# Patient Record
Sex: FEMALE | Race: OTHER | HISPANIC OR LATINO | Employment: OTHER | URBAN - METROPOLITAN AREA
[De-identification: names, ages, dates, MRNs, and addresses within clinical notes are randomized per-mention and may not be internally consistent; named-entity substitution may affect disease eponyms.]

---

## 2017-08-10 ENCOUNTER — CONVERSION ENCOUNTER (OUTPATIENT)
Dept: MAMMOGRAPHY | Facility: CLINIC | Age: 82
End: 2017-08-10

## 2018-01-23 ENCOUNTER — CONVERSION ENCOUNTER (OUTPATIENT)
Dept: MAMMOGRAPHY | Facility: CLINIC | Age: 83
End: 2018-01-23

## 2018-05-07 LAB
ALBUMIN SERPL BCP-MCNC: 4 G/DL (ref 3–5.2)
ALP SERPL-CCNC: 65 U/L (ref 43–122)
ALT SERPL W P-5'-P-CCNC: 23 U/L (ref 9–52)
AMORPHOUS MATERIAL (HISTORICAL): ABNORMAL
ANION GAP SERPL CALCULATED.3IONS-SCNC: 10 MMOL/L (ref 5–14)
AST SERPL W P-5'-P-CCNC: 20 U/L (ref 14–36)
BACTERIA UR QL AUTO: ABNORMAL
BILIRUB SERPL-MCNC: 0.2 MG/DL
BILIRUB UR QL STRIP: NEGATIVE MG/DL
BUN SERPL-MCNC: 19 MG/DL (ref 5–25)
CALCIUM SERPL-MCNC: 9.3 MG/DL (ref 8.4–10.2)
CASTS/CASTS TYPE (HISTORICAL): ABNORMAL /LPF
CHLORIDE SERPL-SCNC: 110 MEQ/L (ref 97–108)
CK SERPL-CCNC: 21 U/L (ref 30–135)
CLARITY UR: CLEAR
CO2 SERPL-SCNC: 23 MMOL/L (ref 22–30)
COLOR UR: ABNORMAL
CREATINE, SERUM (HISTORICAL): 0.85 MG/DL (ref 0.6–1.2)
CRYSTAL TYPE (HISTORICAL): ABNORMAL /HPF
EGFR (HISTORICAL): >60 ML/MIN/1.73 M2
GLUCOSE SERPL-MCNC: 155 MG/DL (ref 70–99)
GLUCOSE UR STRIP-MCNC: NEGATIVE MG/DL
HGB UR QL STRIP.AUTO: NEGATIVE
KETONES UR STRIP-MCNC: NEGATIVE MG/DL
LEUKOCYTE ESTERASE UR QL STRIP: ABNORMAL
LIPASE SERPL-CCNC: 70 U/L (ref 23–300)
MUCOUS THREADS URNS QL MICRO: ABNORMAL
NITRITE UR QL STRIP: NEGATIVE
NON-SQ EPI CELLS URNS QL MICRO: ABNORMAL
OTHER STN SPEC: ABNORMAL
PH UR STRIP.AUTO: 5 [PH] (ref 4.5–8)
POTASSIUM SERPL-SCNC: 4.5 MEQ/L (ref 3.6–5)
PROT UR STRIP-MCNC: 30 MG/DL
RBC #/AREA URNS AUTO: ABNORMAL /HPF
SODIUM SERPL-SCNC: 143 MEQ/L (ref 137–147)
SP GR UR STRIP.AUTO: 1.02 (ref 1–1.04)
TOTAL PROTEIN (HISTORICAL): 6.6 G/DL (ref 5.9–8.4)
UROBILINOGEN UR QL STRIP.AUTO: NEGATIVE MG/DL (ref 0–1)
WBC #/AREA URNS AUTO: >35 /HPF

## 2018-06-14 LAB
ABSOL LYMPHOCYTES (HISTORICAL): 1.6 K/UL (ref 0.5–4)
AMORPHOUS MATERIAL (HISTORICAL): ABNORMAL
ANION GAP SERPL CALCULATED.3IONS-SCNC: 9 MMOL/L (ref 5–14)
BACTERIA UR QL AUTO: ABNORMAL
BASOPHILS # BLD AUTO: 0.1 K/UL (ref 0–0.1)
BASOPHILS # BLD AUTO: 1 % (ref 0–1)
BILIRUB UR QL STRIP: NEGATIVE MG/DL
BUN SERPL-MCNC: 13 MG/DL (ref 5–25)
CALCIUM SERPL-MCNC: 9.4 MG/DL (ref 8.4–10.2)
CASTS/CASTS TYPE (HISTORICAL): ABNORMAL /LPF
CHLORIDE SERPL-SCNC: 104 MEQ/L (ref 97–108)
CLARITY UR: CLEAR
CO2 SERPL-SCNC: 27 MMOL/L (ref 22–30)
COLOR UR: ABNORMAL
COMMENT (HISTORICAL): ABNORMAL
CREATINE, SERUM (HISTORICAL): 0.82 MG/DL (ref 0.6–1.2)
CRYSTAL TYPE (HISTORICAL): ABNORMAL /HPF
DEPRECATED RDW RBC AUTO: 15 %
EGFR (HISTORICAL): >60 ML/MIN/1.73 M2
EOSINOPHIL # BLD AUTO: 0.2 K/UL (ref 0–0.4)
EOSINOPHIL NFR BLD AUTO: 3 % (ref 0–6)
GLUCOSE SERPL-MCNC: 103 MG/DL (ref 70–99)
GLUCOSE UR STRIP-MCNC: NEGATIVE MG/DL
HCT VFR BLD AUTO: 32.6 % (ref 36–46)
HGB BLD-MCNC: 10.2 G/DL (ref 12–16)
HGB UR QL STRIP.AUTO: NEGATIVE
KETONES UR STRIP-MCNC: NEGATIVE MG/DL
LEUKOCYTE ESTERASE UR QL STRIP: ABNORMAL
LYMPHOCYTES NFR BLD AUTO: 30 % (ref 25–45)
MCH RBC QN AUTO: 23.2 PG (ref 26–34)
MCHC RBC AUTO-ENTMCNC: 31.4 % (ref 31–36)
MCV RBC AUTO: 74 FL (ref 80–100)
MONOCYTES # BLD AUTO: 0.4 K/UL (ref 0.2–0.9)
MONOCYTES NFR BLD AUTO: 8 % (ref 1–10)
MUCOUS THREADS URNS QL MICRO: ABNORMAL
NEUTROPHILS ABS COUNT (HISTORICAL): 3 K/UL (ref 1.8–7.8)
NEUTS SEG NFR BLD AUTO: 58 % (ref 45–65)
NITRITE UR QL STRIP: NEGATIVE
NON-SQ EPI CELLS URNS QL MICRO: ABNORMAL
OTHER STN SPEC: ABNORMAL
PH UR STRIP.AUTO: 5 [PH] (ref 4.5–8)
PLATELET # BLD AUTO: 149 K/MCL (ref 150–450)
POTASSIUM SERPL-SCNC: 4.5 MEQ/L (ref 3.6–5)
PROT UR STRIP-MCNC: NEGATIVE MG/DL
RBC # BLD AUTO: 4.42 M/MCL (ref 4–5.2)
RBC #/AREA URNS AUTO: ABNORMAL /HPF
RBC MORPHOLOGY (HISTORICAL): ABNORMAL
SODIUM SERPL-SCNC: 140 MEQ/L (ref 137–147)
SP GR UR STRIP.AUTO: 1.01 (ref 1–1.04)
TSH SERPL DL<=0.05 MIU/L-ACNC: 0.96 UIU/ML (ref 0.47–4.68)
UROBILINOGEN UR QL STRIP.AUTO: NEGATIVE MG/DL (ref 0–1)
WBC # BLD AUTO: 5.3 K/MCL (ref 4.5–11)
WBC #/AREA URNS AUTO: 3 /HPF

## 2018-06-21 ENCOUNTER — TRANSCRIBE ORDERS (OUTPATIENT)
Dept: ADMINISTRATIVE | Facility: HOSPITAL | Age: 83
End: 2018-06-21

## 2018-06-21 DIAGNOSIS — L90.5 SCAR PAINFUL: Primary | ICD-10-CM

## 2018-06-21 DIAGNOSIS — R52 SCAR PAINFUL: Primary | ICD-10-CM

## 2018-06-21 LAB
AMORPHOUS MATERIAL (HISTORICAL): ABNORMAL
BACTERIA UR QL AUTO: ABNORMAL
BILIRUB UR QL STRIP: NEGATIVE MG/DL
CASTS/CASTS TYPE (HISTORICAL): ABNORMAL /LPF
CLARITY UR: CLEAR
COLOR UR: YELLOW
CRYSTAL TYPE (HISTORICAL): ABNORMAL /HPF
GLUCOSE UR STRIP-MCNC: NEGATIVE MG/DL
HGB UR QL STRIP.AUTO: NEGATIVE
KETONES UR STRIP-MCNC: NEGATIVE MG/DL
LEUKOCYTE ESTERASE UR QL STRIP: ABNORMAL
MUCOUS THREADS URNS QL MICRO: ABNORMAL
NITRITE UR QL STRIP: NEGATIVE
NON-SQ EPI CELLS URNS QL MICRO: ABNORMAL
OTHER STN SPEC: ABNORMAL
PH UR STRIP.AUTO: 5 [PH] (ref 4.5–8)
PROT UR STRIP-MCNC: NEGATIVE MG/DL
RBC #/AREA URNS AUTO: ABNORMAL /HPF
SP GR UR STRIP.AUTO: 1.02 (ref 1–1.04)
UROBILINOGEN UR QL STRIP.AUTO: NEGATIVE MG/DL (ref 0–1)
WBC #/AREA URNS AUTO: ABNORMAL /HPF

## 2018-06-27 ENCOUNTER — HOSPITAL ENCOUNTER (OUTPATIENT)
Dept: ULTRASOUND IMAGING | Facility: HOSPITAL | Age: 83
Discharge: HOME | End: 2018-06-27
Payer: COMMERCIAL

## 2018-06-27 DIAGNOSIS — L90.5 SCAR PAINFUL: ICD-10-CM

## 2018-06-27 DIAGNOSIS — R52 SCAR PAINFUL: ICD-10-CM

## 2018-06-27 PROCEDURE — 76770 US EXAM ABDO BACK WALL COMP: CPT

## 2018-06-27 PROCEDURE — 76700 US EXAM ABDOM COMPLETE: CPT

## 2018-07-06 ENCOUNTER — TELEPHONE (OUTPATIENT)
Dept: FAMILY MEDICINE CLINIC | Facility: CLINIC | Age: 83
End: 2018-07-06

## 2018-07-06 DIAGNOSIS — E05.90 HYPERTHYROIDISM: Primary | ICD-10-CM

## 2018-07-06 RX ORDER — METHIMAZOLE 5 MG/1
0.5 TABLET ORAL 2 TIMES DAILY
COMMUNITY
Start: 2014-04-19 | End: 2018-07-06 | Stop reason: SDUPTHER

## 2018-07-06 RX ORDER — METHIMAZOLE 5 MG/1
TABLET ORAL
Qty: 30 TABLET | Refills: 0 | Status: SHIPPED | OUTPATIENT
Start: 2018-07-06

## 2018-07-09 NOTE — TELEPHONE ENCOUNTER
Patient called and told of her medication refill  Patient medication refilled at this time but will need to call Dr Lisa Belcher for further refill  Patient understands

## 2018-07-16 DIAGNOSIS — I10 ESSENTIAL HYPERTENSION: Primary | ICD-10-CM

## 2018-07-17 RX ORDER — CARVEDILOL 12.5 MG/1
TABLET ORAL
Qty: 180 TABLET | Refills: 1 | Status: SHIPPED | OUTPATIENT
Start: 2018-07-17 | End: 2019-08-22 | Stop reason: SDUPTHER

## 2018-07-30 DIAGNOSIS — M54.5 CHRONIC MIDLINE LOW BACK PAIN, WITH SCIATICA PRESENCE UNSPECIFIED: Primary | ICD-10-CM

## 2018-07-30 DIAGNOSIS — G89.29 CHRONIC MIDLINE LOW BACK PAIN, WITH SCIATICA PRESENCE UNSPECIFIED: Primary | ICD-10-CM

## 2018-08-01 LAB
ALBUMIN SERPL-MCNC: 4 G/DL (ref 3.6–5.1)
ALBUMIN/CREAT UR: 11 MCG/MG CREAT
ALBUMIN/GLOB SERPL: 1.7 (CALC) (ref 1–2.5)
ALP SERPL-CCNC: 60 U/L (ref 33–130)
ALT SERPL-CCNC: 9 U/L (ref 6–29)
AST SERPL-CCNC: 17 U/L (ref 10–35)
BASOPHILS # BLD AUTO: 41 CELLS/UL (ref 0–200)
BASOPHILS NFR BLD AUTO: 0.7 %
BILIRUB SERPL-MCNC: 0.5 MG/DL (ref 0.2–1.2)
BUN SERPL-MCNC: 15 MG/DL (ref 7–25)
BUN/CREAT SERPL: 17 (CALC) (ref 6–22)
CALCIUM SERPL-MCNC: 9.3 MG/DL (ref 8.6–10.4)
CHLORIDE SERPL-SCNC: 107 MMOL/L (ref 98–110)
CHOLEST SERPL-MCNC: 169 MG/DL
CHOLEST/HDLC SERPL: 2.7 (CALC)
CO2 SERPL-SCNC: 26 MMOL/L (ref 20–31)
CREAT SERPL-MCNC: 0.89 MG/DL (ref 0.6–0.88)
CREAT UR-MCNC: 84 MG/DL (ref 20–320)
EOSINOPHIL # BLD AUTO: 128 CELLS/UL (ref 15–500)
EOSINOPHIL NFR BLD AUTO: 2.2 %
ERYTHROCYTE [DISTWIDTH] IN BLOOD BY AUTOMATED COUNT: 14.6 % (ref 11–15)
GLOBULIN SER CALC-MCNC: 2.4 G/DL (CALC) (ref 1.9–3.7)
GLUCOSE SERPL-MCNC: 130 MG/DL (ref 65–99)
HBA1C MFR BLD: 6.6 % OF TOTAL HGB
HCT VFR BLD AUTO: 31.9 % (ref 35–45)
HDLC SERPL-MCNC: 63 MG/DL
HGB BLD-MCNC: 10 G/DL (ref 11.7–15.5)
LDLC SERPL CALC-MCNC: 82 MG/DL (CALC)
LYMPHOCYTES # BLD AUTO: 2030 CELLS/UL (ref 850–3900)
LYMPHOCYTES NFR BLD AUTO: 35 %
MCH RBC QN AUTO: 23.5 PG (ref 27–33)
MCHC RBC AUTO-ENTMCNC: 31.3 G/DL (ref 32–36)
MCV RBC AUTO: 74.9 FL (ref 80–100)
MICROALBUMIN UR-MCNC: 0.9 MG/DL
MONOCYTES # BLD AUTO: 597 CELLS/UL (ref 200–950)
MONOCYTES NFR BLD AUTO: 10.3 %
NEUTROPHILS # BLD AUTO: 3004 CELLS/UL (ref 1500–7800)
NEUTROPHILS NFR BLD AUTO: 51.8 %
NONHDLC SERPL-MCNC: 106 MG/DL (CALC)
PLATELET # BLD AUTO: 167 THOUSAND/UL (ref 140–400)
PMV BLD REES-ECKER: 12.5 FL (ref 7.5–12.5)
POTASSIUM SERPL-SCNC: 4.3 MMOL/L (ref 3.5–5.3)
PROT SERPL-MCNC: 6.4 G/DL (ref 6.1–8.1)
RBC # BLD AUTO: 4.26 MILLION/UL (ref 3.8–5.1)
SL AMB EGFR AFRICAN AMERICAN: 70 ML/MIN/1.73M2
SL AMB EGFR NON AFRICAN AMERICAN: 60 ML/MIN/1.73M2
SODIUM SERPL-SCNC: 141 MMOL/L (ref 135–146)
TRIGL SERPL-MCNC: 139 MG/DL
TSH SERPL-ACNC: 1.43 MIU/L (ref 0.4–4.5)
WBC # BLD AUTO: 5.8 THOUSAND/UL (ref 3.8–10.8)

## 2018-08-30 ENCOUNTER — OFFICE VISIT (OUTPATIENT)
Dept: FAMILY MEDICINE CLINIC | Facility: CLINIC | Age: 83
End: 2018-08-30
Payer: COMMERCIAL

## 2018-08-30 VITALS
SYSTOLIC BLOOD PRESSURE: 110 MMHG | WEIGHT: 136.8 LBS | OXYGEN SATURATION: 98 % | RESPIRATION RATE: 20 BRPM | HEIGHT: 57 IN | BODY MASS INDEX: 29.51 KG/M2 | HEART RATE: 97 BPM | DIASTOLIC BLOOD PRESSURE: 60 MMHG | TEMPERATURE: 97 F

## 2018-08-30 DIAGNOSIS — R82.81 PYURIA: ICD-10-CM

## 2018-08-30 DIAGNOSIS — R79.89 LOW SERUM VITAMIN D: ICD-10-CM

## 2018-08-30 DIAGNOSIS — I65.23 CAROTID STENOSIS, ASYMPTOMATIC, BILATERAL: Primary | ICD-10-CM

## 2018-08-30 DIAGNOSIS — L30.9 DERMATITIS: ICD-10-CM

## 2018-08-30 DIAGNOSIS — Z12.31 SCREENING MAMMOGRAM, ENCOUNTER FOR: ICD-10-CM

## 2018-08-30 DIAGNOSIS — E78.00 PURE HYPERCHOLESTEROLEMIA: ICD-10-CM

## 2018-08-30 DIAGNOSIS — D56.1 BETA THALASSEMIA (HCC): ICD-10-CM

## 2018-08-30 DIAGNOSIS — R09.89 WEAK ARTERIAL PULSE: ICD-10-CM

## 2018-08-30 PROCEDURE — 3008F BODY MASS INDEX DOCD: CPT | Performed by: FAMILY MEDICINE

## 2018-08-30 PROCEDURE — 99214 OFFICE O/P EST MOD 30 MIN: CPT | Performed by: FAMILY MEDICINE

## 2018-08-30 RX ORDER — ERGOCALCIFEROL (VITAMIN D2) 1250 MCG
CAPSULE ORAL
COMMUNITY
Start: 2018-05-02 | End: 2019-03-27

## 2018-08-30 RX ORDER — SIMVASTATIN 40 MG
TABLET ORAL
COMMUNITY
Start: 2018-03-05

## 2018-08-30 RX ORDER — CLOTRIMAZOLE AND BETAMETHASONE DIPROPIONATE 10; .5 MG/ML; MG/ML
LOTION TOPICAL 2 TIMES DAILY
Qty: 30 ML | Refills: 0 | Status: SHIPPED | OUTPATIENT
Start: 2018-08-30 | End: 2018-11-28 | Stop reason: ALTCHOICE

## 2018-08-30 NOTE — PROGRESS NOTES
Assessment/Plan:      Diagnoses and all orders for this visit:    Carotid stenosis, asymptomatic, bilateral  -     VAS carotid complete study; Future    Dermatitis  Comments:  wear light cotton  clothing   call if not better or worse   Orders:  -     clotrimazole-betamethasone (LOTRISONE) 1-0 05 % lotion; Apply topically 2 (two) times a day    Low serum vitamin D  -     Vitamin D 25 hydroxy; Future    Beta thalassemia ([facility] [address] )    Screening mammogram, encounter for  -     Mammo screening bilateral w cad; Future    Weak arterial pulse  -     VAS lower limb arterial duplex, complete bilateral; Future    Pyuria  Comments:  resolved     Pure hypercholesterolemia  Comments:  to follow with low fat diet     Other orders  -     simvastatin (ZOCOR) 40 mg tablet; take 1 tablet (40MG)  by oral route  every day in the evening  -     ergocalciferol (ERGOCALCIFEROL) 83496 units capsule; take 1 capsule every  week          Subjective:     Patient ID: Neal Cheng is a 80 y o  female  Itching , x 2 week of right buttock off and one , mild , denied pain , leaking from this area ,   Pt wear pants all the time     Follow up on chronic medical problems  Pyuria, pt denied urinary frequency , dysuria or hematuria   Hyperthyroid ,denied wt loss , diarrhea or palpitation  Low vit d , denied falling    DM, denied polyuria or polydipsia ,follows with endo   Anemia , denied fatigue , rectal bleed , melena     Test results   Lab on 6-21 and  7-31-18 discussed        Review of Systems   Constitutional: Negative for activity change, appetite change, chills, fatigue, fever and unexpected weight change  HENT: Negative for congestion, ear pain, sinus pressure and sore throat  Eyes: Negative for visual disturbance  Respiratory: Negative for cough, chest tightness, shortness of breath and wheezing  Cardiovascular: Negative for chest pain, palpitations and leg swelling     Gastrointestinal: Negative for abdominal pain, blood in stool, constipation, diarrhea, nausea and vomiting  Genitourinary: Negative for dysuria, frequency, hematuria and urgency  Musculoskeletal: Negative for arthralgias, back pain, gait problem, joint swelling, myalgias and neck pain  Neurological: Negative for dizziness, tremors, seizures, syncope, weakness, light-headedness and headaches  Hematological: Negative for adenopathy  Does not bruise/bleed easily  Psychiatric/Behavioral: Negative for behavioral problems, confusion, dysphoric mood and sleep disturbance  Objective:     Physical Exam   Constitutional: She is oriented to person, place, and time  She appears well-developed and well-nourished  HENT:   Head: Normocephalic  Eyes: No scleral icterus  Neck: Neck supple  No JVD present  No thyromegaly present  Cardiovascular: Normal rate and regular rhythm  No murmur heard  Pulses:       Dorsalis pedis pulses are 1+ on the right side, and 1+ on the left side  Posterior tibial pulses are 1+ on the right side, and 1+ on the left side  Pulmonary/Chest: Effort normal and breath sounds normal    Abdominal: Soft  Bowel sounds are normal  She exhibits no distension and no mass  There is no tenderness  There is no rebound and no guarding  Musculoskeletal: She exhibits no edema or tenderness  Chronic arthritic changes of the 1st metatarsal -phalangeal  joint bilaterally   Feet:   Right Foot:   Skin Integrity: Negative for ulcer, skin breakdown, erythema, warmth, callus or dry skin  Left Foot:   Skin Integrity: Negative for ulcer, skin breakdown, erythema, warmth, callus or dry skin  Lymphadenopathy:     She has no cervical adenopathy  Neurological: She is alert and oriented to person, place, and time  No cranial nerve deficit  She exhibits normal muscle tone  Skin: No rash noted  No erythema  No pallor     The is dry , slightly pinkish brandon patch at right buttock and small one at left bottock   Psychiatric: She has a normal mood and affect  Her behavior is normal  Judgment and thought content normal      Right Foot/Ankle   Right Foot Inspection  Skin Exam: skin normal skin not intact, no dry skin, no warmth, no callus, no erythema, no maceration, no abnormal color, no pre-ulcer, no ulcer and no callus                          Toe Exam: no swelling and no tenderness  Sensory   Vibration: intact  Proprioception: intact   Monofilament testing: intact  Vascular    The right DP pulse is 1+  The right PT pulse is 1+  Left Foot/Ankle  Left Foot Inspection  Skin Exam: skin normalskin not intact, no dry skin, no warmth, no erythema, no maceration, normal color, no pre-ulcer, no ulcer and no callus                         Toe Exam: no swelling and no tenderness                   Sensory   Vibration: intact  Proprioception: intact  Monofilament: intact  Vascular    The left DP pulse is 1+  The left PT pulse is 1+

## 2018-08-31 PROBLEM — M48.00 SPINAL STENOSIS: Status: ACTIVE | Noted: 2018-08-31

## 2018-08-31 PROBLEM — E04.1 THYROID NODULE: Status: ACTIVE | Noted: 2018-08-31

## 2018-08-31 PROBLEM — E04.9 NON-TOXIC NODULAR GOITER: Status: ACTIVE | Noted: 2018-08-31

## 2018-08-31 PROBLEM — D56.3 THALASSEMIA MINOR: Status: ACTIVE | Noted: 2018-08-31

## 2018-08-31 PROBLEM — M19.90 OSTEOARTHRITIS: Status: ACTIVE | Noted: 2018-08-31

## 2018-08-31 PROBLEM — K57.90 DIVERTICULOSIS: Status: ACTIVE | Noted: 2018-08-31

## 2018-08-31 PROBLEM — I10 ESSENTIAL HYPERTENSION: Status: ACTIVE | Noted: 2017-03-07

## 2018-08-31 PROBLEM — I65.29 CAROTID ARTERY STENOSIS: Status: ACTIVE | Noted: 2018-08-31

## 2018-08-31 PROBLEM — D64.9 ANEMIA: Status: ACTIVE | Noted: 2017-03-07

## 2018-08-31 PROBLEM — K21.9 GASTROESOPHAGEAL REFLUX DISEASE: Status: ACTIVE | Noted: 2018-08-31

## 2018-08-31 PROBLEM — D25.9 UTERINE FIBROID: Status: ACTIVE | Noted: 2018-08-31

## 2018-08-31 PROBLEM — I51.7 CARDIOMEGALY: Status: ACTIVE | Noted: 2018-08-31

## 2018-09-10 ENCOUNTER — HOSPITAL ENCOUNTER (OUTPATIENT)
Dept: MAMMOGRAPHY | Facility: CLINIC | Age: 83
Discharge: HOME | End: 2018-09-10
Payer: COMMERCIAL

## 2018-09-10 DIAGNOSIS — Z12.31 SCREENING MAMMOGRAM, ENCOUNTER FOR: ICD-10-CM

## 2018-09-10 PROCEDURE — 77067 SCR MAMMO BI INCL CAD: CPT

## 2018-10-28 ENCOUNTER — OFFICE VISIT (OUTPATIENT)
Dept: URGENT CARE | Age: 83
End: 2018-10-28
Payer: COMMERCIAL

## 2018-10-28 VITALS
RESPIRATION RATE: 18 BRPM | HEART RATE: 77 BPM | SYSTOLIC BLOOD PRESSURE: 140 MMHG | TEMPERATURE: 98.1 F | WEIGHT: 137 LBS | BODY MASS INDEX: 29.56 KG/M2 | HEIGHT: 57 IN | OXYGEN SATURATION: 98 % | DIASTOLIC BLOOD PRESSURE: 60 MMHG

## 2018-10-28 DIAGNOSIS — J02.9 VIRAL PHARYNGITIS: Primary | ICD-10-CM

## 2018-10-28 PROCEDURE — 99203 OFFICE O/P NEW LOW 30 MIN: CPT | Performed by: PHYSICIAN ASSISTANT

## 2018-10-28 NOTE — PROGRESS NOTES
[address] [facility]        NAME: Bijan Ricketts is a 80 y o  female  : 1935    MRN: [id number]  DATE: 2018  TIME: 10:21 AM    Assessment and Plan   Viral pharyngitis [J02 9]  1  Viral pharyngitis  lidocaine viscous (XYLOCAINE) 2 % mucosal solution         Patient Instructions     Lidocaine as prescribed  Fluids and rest  Salt water gargles and chloraseptic spray  Throat Coat Tea  Wash hands frequently  Don't share drinks  Tylenol/Ibuprofen for pain/fever  Follow up with PCP in 3-5 days  Proceed to  ER if symptoms worsen  Chief Complaint     Chief Complaint   Patient presents with    Sore Throat     3 days; sore to swallow ; worsening         History of Present Illness       Sore Throat    This is a new problem  Episode onset: 3 days  The problem has been gradually worsening  Neither side of throat is experiencing more pain than the other  There has been no fever  The pain is moderate  Associated symptoms include coughing  Pertinent negatives include no abdominal pain, congestion, diarrhea, drooling, ear discharge, ear pain, headaches, hoarse voice, plugged ear sensation, neck pain, shortness of breath, stridor, swollen glands, trouble swallowing or vomiting  Associated symptoms comments: Hoarse voice    She has had no exposure to strep  Treatments tried: hot tea with honey  The treatment provided mild relief  Review of Systems   Review of Systems   Constitutional: Negative for activity change, appetite change, chills and fever  HENT: Positive for sore throat  Negative for congestion, dental problem, drooling, ear discharge, ear pain, facial swelling, hoarse voice, postnasal drip, rhinorrhea, sinus pain, sinus pressure, sneezing and trouble swallowing  Eyes: Negative for itching  Respiratory: Positive for cough  Negative for shortness of breath and stridor  Cardiovascular: Negative for chest pain and palpitations     Gastrointestinal: Negative for abdominal pain, constipation, diarrhea, nausea and vomiting  Musculoskeletal: Negative for myalgias and neck pain  Skin: Negative for rash  Neurological: Negative for dizziness, weakness, light-headedness and headaches           Current Medications       Current Outpatient Prescriptions:     aspirin 81 MG tablet, Take 81 mg by mouth daily  , Disp: , Rfl:     carvedilol (COREG) 12 5 mg tablet, take 1 tablet by mouth twice a day with food, Disp: 180 tablet, Rfl: 1    clotrimazole-betamethasone (LOTRISONE) 1-0 05 % lotion, Apply topically 2 (two) times a day, Disp: 30 mL, Rfl: 0    ergocalciferol (ERGOCALCIFEROL) 57144 units capsule, take 1 capsule every  week, Disp: , Rfl:     furosemide (LASIX) 20 mg tablet, take 1 tablet by mouth once daily PRN, Disp: , Rfl:     lidocaine viscous (XYLOCAINE) 2 % mucosal solution, Swish and spit 15 mL 4 (four) times a day as needed for mild pain, Disp: 100 mL, Rfl: 0    lisinopril (ZESTRIL) 20 mg tablet, Take 20 mg by mouth daily at bedtime, Disp: , Rfl: 0    LYRICA 75 MG capsule, take 1 capsule by mouth once daily, Disp: 90 capsule, Rfl: 0    methimazole (TAPAZOLE) 5 mg tablet, Take 1/2 tab BID by mouth daily, Disp: 30 tablet, Rfl: 0    Multiple Vitamins-Minerals (MULTIVITAMIN ADULT PO), Take 1 tablet po once daily , Disp: , Rfl:     simvastatin (ZOCOR) 40 mg tablet, take 1 tablet (40MG)  by oral route  every day in the evening, Disp: , Rfl:     SITagliptin-MetFORMIN HCl ER (JANUMET XR)  MG TB24, Take 1 tablet by mouth daily  , Disp: , Rfl:     Current Allergies     Allergies as of 10/28/2018    (No Known Allergies)            The following portions of the patient's history were reviewed and updated as appropriate: allergies, current medications, past family history, past medical history, past social history, past surgical history and problem list      Past Medical History:   Diagnosis Date    Anemia     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[facility] [address] )     Bilateral carotid artery stenosis     Cardiomegaly     Chicken pox     Chronic kidney disease, unspecified     Diabetes mellitus ([address] )     Hypertension     Menopausal state     Umbilical hernia     Vitamin D deficiency        Past Surgical History:   Procedure Laterality Date    APPENDECTOMY      CHOLECYSTECTOMY      UMBILICAL HERNIA REPAIR         Family History   Problem Relation Age of Onset    Diabetes Mother     Colon cancer Family          Medications have been verified  Objective   /60   Pulse 77   Temp 98 1 °F (36 7 °C)   Resp 18   Ht 4' 9" (1 448 m)   Wt 62 1 kg (137 lb)   SpO2 98%   BMI 29 65 kg/m²        Physical Exam     Physical Exam   Constitutional: She is oriented to person, place, and time  She appears well-developed and well-nourished  No distress  HENT:   Head: Normocephalic and atraumatic  Right Ear: External ear normal    Left Ear: External ear normal    Mouth/Throat: Oropharynx is clear and moist  No oropharyngeal exudate  Posterior pharynx erythematous without tonsillar hypertrophy or exudate  Cardiovascular: Normal rate, regular rhythm and normal heart sounds  Exam reveals no gallop and no friction rub  No murmur heard  Pulmonary/Chest: Effort normal  No respiratory distress  She has no wheezes  She has no rales  She exhibits no tenderness  Abdominal: Soft  There is no tenderness  Lymphadenopathy:     She has no cervical adenopathy  Neurological: She is alert and oriented to person, place, and time  Skin: Skin is warm and dry  No rash noted  Psychiatric: She has a normal mood and affect   Her behavior is normal  Judgment and thought content normal

## 2018-10-28 NOTE — PATIENT INSTRUCTIONS
Lidocaine as prescribed  Fluids and rest  Salt water gargles and chloraseptic spray  Throat Coat Tea  Wash hands frequently  Don't share drinks  Tylenol/Ibuprofen for pain/fever  Follow up with PCP in 3-5 days  Proceed to  ER if symptoms worsen  Pharyngitis   WHAT YOU NEED TO KNOW:   Pharyngitis, or sore throat, is inflammation of the tissues and structures in your pharynx (throat)  Pharyngitis is most often caused by bacteria  It may also be caused by a cold or flu virus  Other causes include smoking, allergies, or acid reflux  DISCHARGE INSTRUCTIONS:   Call 911 for any of the following:   · You have trouble breathing or swallowing because your throat is swollen or sore  Return to the emergency department if:   · You are drooling because it hurts too much to swallow  · Your fever is higher than 102? F (39?C) or lasts longer than 3 days  · You are confused  · You taste blood in your throat  Contact your healthcare provider if:   · Your throat pain gets worse  · You have a painful lump in your throat that does not go away after 5 days  · Your symptoms do not improve after 5 days  · You have questions or concerns about your condition or care  Medicines:  Viral pharyngitis will go away on its own without treatment  Your sore throat should start to feel better in 3 to 5 days for both viral and bacterial infections  You may need any of the following:  · Antibiotics  treat a bacterial infection  · NSAIDs , such as ibuprofen, help decrease swelling, pain, and fever  NSAIDs can cause stomach bleeding or kidney problems in certain people  If you take blood thinner medicine, always ask your healthcare provider if NSAIDs are safe for you  Always read the medicine label and follow directions  · Acetaminophen  decreases pain and fever  It is available without a doctor's order  Ask how much to take and how often to take it  Follow directions   Acetaminophen can cause liver damage if not taken correctly  · Take your medicine as directed  Contact your healthcare provider if you think your medicine is not helping or if you have side effects  Tell him or her if you are allergic to any medicine  Keep a list of the medicines, vitamins, and herbs you take  Include the amounts, and when and why you take them  Bring the list or the pill bottles to follow-up visits  Carry your medicine list with you in case of an emergency  Manage your symptoms:   · Gargle salt water  Mix ¼ teaspoon salt in an 8 ounce glass of warm water and gargle  This may help decrease swelling in your throat  · Drink liquids as directed  You may need to drink more liquids than usual  Liquids may help soothe your throat and prevent dehydration  Ask how much liquid to drink each day and which liquids are best for you  · Use a cool-steam humidifier  to help moisten the air in your room and calm your cough  · Soothe your throat  with cough drops, ice, soft foods, or popsicles  Prevent the spread of pharyngitis:  Cover your mouth and nose when you cough or sneeze  Do not share food or drinks  Wash your hands often  Use soap and water  If soap and water are unavailable, use an alcohol based hand   Follow up with your healthcare provider as directed:  Write down your questions so you remember to ask them during your visits  © 2017 [address] [employer] Information is for End User's use only and may not be sold, redistributed or otherwise used for commercial purposes  All illustrations and images included in CareNotes® are the copyrighted property of [employer]  or [employer]  The above information is an  only  It is not intended as medical advice for individual conditions or treatments  Talk to your doctor, nurse or pharmacist before following any medical regimen to see if it is safe and effective for you

## 2018-10-31 ENCOUNTER — OFFICE VISIT (OUTPATIENT)
Dept: FAMILY MEDICINE CLINIC | Facility: CLINIC | Age: 83
End: 2018-10-31
Payer: COMMERCIAL

## 2018-10-31 VITALS
WEIGHT: 135.6 LBS | BODY MASS INDEX: 29.34 KG/M2 | RESPIRATION RATE: 20 BRPM | OXYGEN SATURATION: 97 % | TEMPERATURE: 97 F | DIASTOLIC BLOOD PRESSURE: 60 MMHG | HEART RATE: 71 BPM | SYSTOLIC BLOOD PRESSURE: 120 MMHG

## 2018-10-31 DIAGNOSIS — R79.89 LOW VITAMIN D LEVEL: ICD-10-CM

## 2018-10-31 DIAGNOSIS — M79.18 CHRONIC BUTTOCK PAIN: Primary | ICD-10-CM

## 2018-10-31 DIAGNOSIS — Z23 NEED FOR IMMUNIZATION AGAINST INFLUENZA: ICD-10-CM

## 2018-10-31 DIAGNOSIS — R09.89 DECREASED PEDAL PULSES: ICD-10-CM

## 2018-10-31 DIAGNOSIS — I10 ESSENTIAL HYPERTENSION: ICD-10-CM

## 2018-10-31 DIAGNOSIS — I65.23 BILATERAL CAROTID ARTERY STENOSIS: ICD-10-CM

## 2018-10-31 DIAGNOSIS — J02.9 ACUTE PHARYNGITIS, UNSPECIFIED ETIOLOGY: ICD-10-CM

## 2018-10-31 DIAGNOSIS — G89.29 CHRONIC BUTTOCK PAIN: Primary | ICD-10-CM

## 2018-10-31 PROCEDURE — 3078F DIAST BP <80 MM HG: CPT | Performed by: FAMILY MEDICINE

## 2018-10-31 PROCEDURE — 3074F SYST BP LT 130 MM HG: CPT | Performed by: FAMILY MEDICINE

## 2018-10-31 PROCEDURE — 90662 IIV NO PRSV INCREASED AG IM: CPT

## 2018-10-31 PROCEDURE — 99214 OFFICE O/P EST MOD 30 MIN: CPT | Performed by: FAMILY MEDICINE

## 2018-10-31 PROCEDURE — G0008 ADMIN INFLUENZA VIRUS VAC: HCPCS

## 2018-10-31 NOTE — PROGRESS NOTES
Assessment/Plan:     Problem List Items Addressed This Visit     Carotid artery stenosis    Essential hypertension      Other Visit Diagnoses     Chronic buttock pain    -  Primary     bilateral    Acute pharyngitis, unspecified etiology        Improving  Patient to call if any further problem    Decreased pedal pulses         check arterial Doppler of the lower extremity    Low vitamin D level         check vitamin-D    Need for immunization against influenza        Relevant Orders    influenza vaccine, [id number], high-dose, PF 0 5 mL, for patients 65 yr+ (FLUZONE HIGH-DOSE) (Completed)           Diagnoses and all orders for this visit:    Chronic buttock pain  Comments:   bilateral    Acute pharyngitis, unspecified etiology  Comments:  Improving  Patient to call if any further problem    Essential hypertension  Comments:   controlled  Follow up with the dash diet    Decreased pedal pulses  Comments:   check arterial Doppler of the lower extremity    Bilateral carotid artery stenosis  Comments:   check carotid ultrasound    Low vitamin D level  Comments:   check vitamin-D    Need for immunization against influenza  -     influenza vaccine, [id number], high-dose, PF 0 5 mL, for patients 65 yr+ (FLUZONE HIGH-DOSE)            Subjective:     Patient ID: Maynor Crowe is a 80 y o  female        New complaint  Heavenly Prows Upper respiratory infection  Patient developed a slight sore throat and dry cough since last Friday  Was seen at the urgent care  Patient stated her symptoms much better  Denied the fever or chills  Buttock pain  Patient stated when she sit on heart the service chair she feels pain at both buttock  Denied any pain if she sits on the soft service  Denied any mass  Denied any skin problem  Symptoms has started 1 year ago  Mild  Sharp  Follow-up     Thyroid nodule  Denied neck mass she follow with Endocrinology    Diabetes denied polyuria and polydipsia her fasting blood sugar today was 130 she does follow with Endocrinology  Carotid stenosis  Denied loss of vision, slurred speech, weakness or numbness  Hypertension denied headache or       urgent care office visit 10/28/2018 noted   no test done since last office visit  Patient was supposed to have carotid ultrasound arterial Doppler of the lower extremities and vitamin-D level        Review of Systems   Constitutional: Negative for activity change, appetite change, chills, fatigue, fever and unexpected weight change  HENT: Positive for sore throat  Negative for congestion, ear pain and sinus pressure  Eyes: Negative for visual disturbance  Respiratory: Positive for cough  Negative for chest tightness, shortness of breath and wheezing  Cardiovascular: Negative for chest pain, palpitations and leg swelling  Gastrointestinal: Negative for abdominal pain, blood in stool, constipation, diarrhea, nausea and vomiting  Genitourinary: Negative for dysuria, frequency, hematuria and urgency  Musculoskeletal: Negative for arthralgias, back pain, gait problem, joint swelling, myalgias and neck pain  Skin: Negative for rash  Neurological: Negative for dizziness, tremors, seizures, syncope, weakness, light-headedness and headaches  Hematological: Negative for adenopathy  Does not bruise/bleed easily  Psychiatric/Behavioral: Negative for behavioral problems, confusion, dysphoric mood and sleep disturbance  Objective:     Physical Exam   Constitutional: She is oriented to person, place, and time  She appears well-developed and well-nourished  No distress  HENT:   Head: Normocephalic  Right Ear: External ear normal    Left Ear: External ear normal    Nose: Nose normal    Mouth/Throat: Oropharynx is clear and moist  No oropharyngeal exudate  Tympanic membranes are   Eyes: Pupils are equal, round, and reactive to light  Conjunctivae and EOM are normal  No scleral icterus  Neck: No JVD present  No thyromegaly present  Cardiovascular: Normal rate and regular rhythm  Pulses are weak pulses  No murmur heard  Pulses:       Dorsalis pedis pulses are 1+ on the right side, and 1+ on the left side  Posterior tibial pulses are 1+ on the right side, and 1+ on the left side  Extremities  No edema   Pulmonary/Chest: Effort normal    Abdominal: Soft  She exhibits no mass  There is no tenderness  There is no rebound and no guarding  No hernia  Musculoskeletal:   Lumbar spine  No tenderness  Pelvic bone there is no tenderness at the buttock area, no mass  Skin is intact  No rash    There is mild to moderate bunion of the 1st metatarsal cup per joints of the right foot   Feet:   Right Foot:   Skin Integrity: Positive for dry skin  Negative for ulcer, skin breakdown, erythema, warmth or callus  Left Foot:   Skin Integrity: Positive for dry skin  Negative for ulcer, skin breakdown, erythema, warmth or callus  Lymphadenopathy:     She has no cervical adenopathy  Neurological: She is alert and oriented to person, place, and time  She displays normal reflexes  No cranial nerve deficit or sensory deficit  She exhibits normal muscle tone  Coordination normal    Gait  Normal   Babinski bilaterally normal   Skin: No rash noted  Psychiatric: She has a normal mood and affect  Her behavior is normal  Judgment normal    Right Foot/Ankle   Right Foot Inspection  Skin Exam: dry skin skin not intact, no warmth, no callus, no erythema, no maceration, no abnormal color, no ulcer and no callus                          Toe Exam: ROM and strength within normal limits, erythema and right toe deformityno swelling and no tenderness  Sensory   Vibration: intact  Proprioception: intact   Monofilament testing: intact  Vascular  Capillary refills: < 3 seconds  The right DP pulse is 1+  The right PT pulse is 1+       Left Foot/Ankle  Left Foot Inspection  Skin Exam: dry skinskin not intact, no warmth, no erythema, no maceration, normal color, no pre-ulcer, no ulcer and no callus                         Toe Exam: no swelling, no tenderness, no erythema and no left toe deformity                   Sensory   Vibration: intact  Proprioception: intact  Monofilament: intact  Vascular  Capillary refills: < 3 seconds  The left DP pulse is 1+  The left PT pulse is 1+  Assign Risk Category:  Deformity present;  No loss of protective sensation; Weak pulses       Risk: 1

## 2018-11-09 DIAGNOSIS — M54.5 CHRONIC MIDLINE LOW BACK PAIN, WITH SCIATICA PRESENCE UNSPECIFIED: ICD-10-CM

## 2018-11-09 DIAGNOSIS — G89.29 CHRONIC MIDLINE LOW BACK PAIN, WITH SCIATICA PRESENCE UNSPECIFIED: ICD-10-CM

## 2018-11-11 RX ORDER — PREGABALIN 75 MG/1
75 CAPSULE ORAL DAILY
Qty: 90 CAPSULE | Refills: 0 | Status: SHIPPED | OUTPATIENT
Start: 2018-11-11 | End: 2019-02-12

## 2018-11-15 ENCOUNTER — HOSPITAL ENCOUNTER (OUTPATIENT)
Dept: NON INVASIVE DIAGNOSTICS | Facility: HOSPITAL | Age: 83
Discharge: HOME | End: 2018-11-15
Payer: COMMERCIAL

## 2018-11-15 DIAGNOSIS — I65.23 CAROTID STENOSIS, ASYMPTOMATIC, BILATERAL: ICD-10-CM

## 2018-11-15 DIAGNOSIS — R09.89 WEAK ARTERIAL PULSE: ICD-10-CM

## 2018-11-15 PROCEDURE — 93925 LOWER EXTREMITY STUDY: CPT | Performed by: SURGERY

## 2018-11-15 PROCEDURE — 93923 UPR/LXTR ART STDY 3+ LVLS: CPT

## 2018-11-15 PROCEDURE — 93922 UPR/L XTREMITY ART 2 LEVELS: CPT | Performed by: SURGERY

## 2018-11-15 PROCEDURE — 93925 LOWER EXTREMITY STUDY: CPT

## 2018-11-15 PROCEDURE — 93880 EXTRACRANIAL BILAT STUDY: CPT | Performed by: SURGERY

## 2018-11-15 PROCEDURE — 93880 EXTRACRANIAL BILAT STUDY: CPT

## 2018-11-28 ENCOUNTER — OFFICE VISIT (OUTPATIENT)
Dept: FAMILY MEDICINE CLINIC | Facility: CLINIC | Age: 83
End: 2018-11-28
Payer: COMMERCIAL

## 2018-11-28 VITALS
TEMPERATURE: 97 F | OXYGEN SATURATION: 100 % | WEIGHT: 135.8 LBS | RESPIRATION RATE: 20 BRPM | HEART RATE: 77 BPM | SYSTOLIC BLOOD PRESSURE: 104 MMHG | DIASTOLIC BLOOD PRESSURE: 60 MMHG | BODY MASS INDEX: 29.39 KG/M2

## 2018-11-28 DIAGNOSIS — F41.9 ANXIETY: ICD-10-CM

## 2018-11-28 DIAGNOSIS — R79.89 LOW VITAMIN D LEVEL: ICD-10-CM

## 2018-11-28 DIAGNOSIS — E04.1 THYROID NODULE: ICD-10-CM

## 2018-11-28 DIAGNOSIS — D25.9 UTERINE LEIOMYOMA, UNSPECIFIED LOCATION: Primary | ICD-10-CM

## 2018-11-28 DIAGNOSIS — I65.23 BILATERAL CAROTID ARTERY STENOSIS: ICD-10-CM

## 2018-11-28 PROCEDURE — 4040F PNEUMOC VAC/ADMIN/RCVD: CPT | Performed by: FAMILY MEDICINE

## 2018-11-28 PROCEDURE — 1160F RVW MEDS BY RX/DR IN RCRD: CPT | Performed by: FAMILY MEDICINE

## 2018-11-28 PROCEDURE — 99214 OFFICE O/P EST MOD 30 MIN: CPT | Performed by: FAMILY MEDICINE

## 2018-11-28 PROCEDURE — 1036F TOBACCO NON-USER: CPT | Performed by: FAMILY MEDICINE

## 2018-11-28 RX ORDER — ESCITALOPRAM OXALATE 5 MG/1
5 TABLET ORAL DAILY
Qty: 30 TABLET | Refills: 1 | Status: SHIPPED | OUTPATIENT
Start: 2018-11-28 | End: 2018-11-29 | Stop reason: SDUPTHER

## 2018-11-28 NOTE — PROGRESS NOTES
Assessment/Plan:          Diagnoses and all orders for this visit:    Uterine leiomyoma, unspecified location  -     US pelvis complete non OB; Future    Anxiety  Comments:  Patient take Lexapro as directed  Avoid drinking alcohol with medication  Call if any side effect  Orders:  -     Discontinue: escitalopram (LEXAPRO) 5 mg tablet; Take 1 tablet (5 mg total) by mouth daily  -     escitalopram (LEXAPRO) 5 mg tablet; Take 1 tablet (5 mg total) by mouth daily    Thyroid nodule  -     US thyroid; Future    Bilateral carotid artery stenosis  Comments:  Check carotid ultrasound in 1 year    Low vitamin D level  Comments:  Check vitamin-D level  Was ordered with previous office visit            Subjective:     Patient ID: Prince Cisse is a 80 y o  female        Patient is here for follow-up  Carotid stenosis  Patient denied loss of vision, slurred speech, weakness or numbness  Low vitamin-D falling or fatigue  Anxiety  Patient was a chronic mild anxiety  Constant  Secondary to having tow Sick daughters  Denied depression, suicide or homicide  Uterine fibroid  Patient denied pelvic pain, or vaginal bleeding  Diabetes  Today fasting blood sugar weight is 120  Patient follows with Endocrinology    Test results  Carotid ultrasound  Arterial Doppler of the lower extremities  Discussed result with patient  Vitamin-D level not done        Review of Systems   Constitutional: Negative for chills, diaphoresis and fatigue  HENT: Negative for ear pain, sore throat, trouble swallowing and voice change  Eyes: Negative for visual disturbance  Respiratory: Negative for cough, chest tightness and shortness of breath  Cardiovascular: Negative for chest pain, palpitations and leg swelling  Gastrointestinal: Negative for abdominal pain, blood in stool, constipation, diarrhea and nausea  Endocrine: Negative for polydipsia and polyuria     Genitourinary: Negative for dysuria, flank pain, frequency, hematuria, pelvic pain, urgency, vaginal bleeding and vaginal discharge  Musculoskeletal: Negative for arthralgias, back pain, gait problem, myalgias and neck pain  Neurological: Negative for dizziness, tremors, seizures, weakness, light-headedness, numbness and headaches  Hematological: Negative for adenopathy  Does not bruise/bleed easily  Psychiatric/Behavioral: Negative for confusion  Objective:     Physical Exam   Constitutional: She is oriented to person, place, and time  She appears well-developed and well-nourished  No distress  HENT:   Head: Normocephalic  Eyes: Pupils are equal, round, and reactive to light  Conjunctivae and EOM are normal  No scleral icterus  Neck: No JVD present  No thyromegaly present  Cardiovascular: Normal rate, regular rhythm and normal heart sounds  No murmur heard  Extremities  No edema   Pulmonary/Chest: Effort normal and breath sounds normal    Abdominal: Soft  She exhibits no mass  There is no tenderness  There is no rebound and no guarding  No hernia  Lymphadenopathy:     She has no cervical adenopathy  Neurological: She is alert and oriented to person, place, and time  No cranial nerve deficit  She exhibits normal muscle tone  Coordination normal    Skin: No rash noted  Psychiatric: She has a normal mood and affect   Her behavior is normal  Judgment and thought content normal

## 2018-11-29 RX ORDER — ESCITALOPRAM OXALATE 5 MG/1
5 TABLET ORAL DAILY
Qty: 30 TABLET | Refills: 0 | Status: SHIPPED | OUTPATIENT
Start: 2018-11-29 | End: 2018-12-24 | Stop reason: SDUPTHER

## 2018-12-04 ENCOUNTER — TELEPHONE (OUTPATIENT)
Dept: FAMILY MEDICINE CLINIC | Facility: CLINIC | Age: 83
End: 2018-12-04

## 2018-12-04 ENCOUNTER — HOSPITAL ENCOUNTER (OUTPATIENT)
Dept: ULTRASOUND IMAGING | Facility: HOSPITAL | Age: 83
Discharge: HOME | End: 2018-12-04
Payer: COMMERCIAL

## 2018-12-04 DIAGNOSIS — E04.1 THYROID NODULE: ICD-10-CM

## 2018-12-04 DIAGNOSIS — D25.9 UTERINE LEIOMYOMA, UNSPECIFIED LOCATION: ICD-10-CM

## 2018-12-04 DIAGNOSIS — R93.5 ABNORMAL ULTRASOUND OF UTERUS: Primary | ICD-10-CM

## 2018-12-04 PROCEDURE — 76856 US EXAM PELVIC COMPLETE: CPT

## 2018-12-04 PROCEDURE — 76830 TRANSVAGINAL US NON-OB: CPT

## 2018-12-04 PROCEDURE — 76536 US EXAM OF HEAD AND NECK: CPT

## 2018-12-24 DIAGNOSIS — F41.9 ANXIETY: ICD-10-CM

## 2019-01-02 ENCOUNTER — OFFICE VISIT (OUTPATIENT)
Dept: CARDIOLOGY CLINIC | Facility: CLINIC | Age: 84
End: 2019-01-02
Payer: COMMERCIAL

## 2019-01-02 VITALS
WEIGHT: 133.4 LBS | OXYGEN SATURATION: 100 % | HEIGHT: 57 IN | BODY MASS INDEX: 28.78 KG/M2 | HEART RATE: 68 BPM | DIASTOLIC BLOOD PRESSURE: 60 MMHG | SYSTOLIC BLOOD PRESSURE: 110 MMHG

## 2019-01-02 DIAGNOSIS — I10 HYPERTENSION, UNSPECIFIED TYPE: Primary | ICD-10-CM

## 2019-01-02 DIAGNOSIS — I10 ESSENTIAL HYPERTENSION: ICD-10-CM

## 2019-01-02 PROBLEM — E78.2 MIXED HYPERLIPIDEMIA: Status: ACTIVE | Noted: 2019-01-02

## 2019-01-02 PROCEDURE — 99214 OFFICE O/P EST MOD 30 MIN: CPT | Performed by: INTERNAL MEDICINE

## 2019-01-02 PROCEDURE — 93000 ELECTROCARDIOGRAM COMPLETE: CPT | Performed by: INTERNAL MEDICINE

## 2019-01-02 RX ORDER — LISINOPRIL 10 MG/1
10 TABLET ORAL
Qty: 90 TABLET | Refills: 3 | Status: SHIPPED | OUTPATIENT
Start: 2019-01-02 | End: 2020-03-15 | Stop reason: SDUPTHER

## 2019-01-02 RX ORDER — SITAGLIPTIN AND METFORMIN HYDROCHLORIDE 100; 1000 MG/1; MG/1
1 TABLET, FILM COATED, EXTENDED RELEASE ORAL DAILY
Refills: 0 | COMMUNITY
Start: 2018-12-28 | End: 2019-01-17 | Stop reason: ALTCHOICE

## 2019-01-02 NOTE — PROGRESS NOTES
Shikha [address]    [address], [address]   [address]  [address]  Phone#  [phone]  Fax#  [phone]  *-*-*-*-*-*-*-*-*-*-*-*-*-*-*-*-*-*-*-*-*-*-*-*-*-*-*-*-*-*-*-*-*-*-*-*-*-*-*-*-*-*-*-*-*-*-*-*-*-*-*-*-*-*    ENCOUNTER DATE: 01/02/19 9:14 AM    PATIENT NAME: Giovanna Davidson   1935    [id number]  Age: 80 y o  Sex: female    AUTHOR: Ana Howard MD  PRIMARYCARE PHYSICIAN: Aramis Stuart MD    DIAGNOSES:  1  Benign essential hypertension  2  Mixed dyslipidemia  3  History of chest pain  4  History of diabetes mellitus  5  Hypo and hyperthyroidism  6  Anemia  7  Degenerative joint disease  8  History of diverticulosis  9  Cervical radiculopathy  10 History of carotid artery disease  11  History of cholecystectomy and appendectomy  12  Vitamin-D deficiency  13  History of umbilical hernia    Dobutamine stress echocardiogram at [facility] in March 2017 was negative for ischemia  EF was reported as 60%  No significant valvular abnormality  CURRENT ECG:  Results for orders placed or performed in visit on 01/02/19   POCT ECG    Narrative    Sinus rhythm, HR 68 bpm, leftward axis, RSR prime in lead V1, nonspecific ST T-wave abnormalities  CARDIOLOGY ASSESSMENT & PLAN:  Essential hypertension  Blood pressure is reasonably well controlled  Last echocardiogram in 2017 showed normal left ventricular function  - we are lowering the dose of lisinopril to 10 mg daily as long as this would maintain her blood pressure consistently less than 130/80   - we are continuing carvedilol and other medications  - Dietary and medical compliance are reinforced  - Advised  to report any concerning symptoms such as chest pain, shortness of breath, decline in exercise tolerance or presyncope/syncope  Mixed hyperlipidemia  Tolerating low-dose simvastatin therapy  No recent lipid profile available      Will need periodic monitoring of LFTs and lipid profile  INTERVAL HISTORY & HISTORY OF PRESENT ILLNESS:  Omer Vargas is here for follow-up regarding her cardiac comorbidities which include: history of chest pain  She is here for follow-up  She has been overall doing well from cardiac perspective except for occasional dizziness which she feels when she more suddenly  Has had no recent typical chest pain, palpitations or other symptoms  Exercise tolerance is good and unchanged  No recent hospitalizations or other illnesses  She is physically active and visits her disabled daughter in nursing home 2 to 3 times a week  She is wondering if antihypertensive can be lowered as she feels she is taking too much  REVIEW OF SYMPTOMS:    Positive for:  No significant symptoms except for occasional dizziness  Negative for: All remaining as reviewed below and in HPI  SYSTEM SYMPTOMS REVIEWED:  General--weight change, fever, night sweats  Respirato  Cardiovascular--chest pain, syncope, dyspnea on exertion, edema, decline in exercise tolerance, claudication   Gastrointestinal--persistent vomiting, diarrhea, abdominal distention, blood in stool   Muscular or skeletal--joint pain or swelling   Neurologic--headaches, syncope, abnormal movement  Hematologic--history of easy bruising and bleeding   Endocrine--thyroid enlargement, heat or cold intolerance, polyuria   Psychiatric--anxiety, depression     *-*-*-*-*-*-*-*-*-*-*-*-*-*-*-*-*-*-*-*-*-*-*-*-*-*-*-*-*-*-*-*-*-*-*-*-*-*-*-*-*-*-*-*-*-*-*-*-*-*-*-*-*-*-  VITAL SIGNS:  Vitals:    01/02/19 0839   BP: 110/60   Pulse: 68   SpO2: 100%   Weight: 60 5 kg (133 lb 6 4 oz)   Height: 4' 9" (1 448 m)       *-*-*-*-*-*-*-*-*-*-*-*-*-*-*-*-*-*-*-*-*-*-*-*-*-*-*-*-*-*-*-*-*-*-*-*-*-*-*-*-*-*-*-*-*-*-*-*-*-*-*-*-*-*-  PHYSICAL EXAM:  General Appearance:    Alert, cooperative, no distress, appears stated age short stature, normal built   Head, Eyes, ENT:    No obvious abnormality, moist mucous mebranes     Neck: Supple, no carotid bruit or JVD   Back:     Symmetric, no curvature  Lungs:     Respirations unlabored   Clear to auscultation bilaterally,    Chest wall:    No tenderness or deformity   Heart:    Regular rate and rhythm, S1 and S2 normal, no murmur, rub  or gallop ,    Abdomen:     Soft, non-tender, No obvious masses, or organomegaly   Extremities:   Extremities normal, no cyanosis or edema    Skin:   Skin color, texture, turgor normal, no rashes or lesions     *-*-*-*-*-*-*-*-*-*-*-*-*-*-*-*-*-*-*-*-*-*-*-*-*-*-*-*-*-*-*-*-*-*-*-*-*-*-*-*-*-*-*-*-*-*-*-*-*-*-*-*-*-*-  CURRENT MEDICATION LIST:    Current Outpatient Prescriptions:     aspirin 81 MG tablet, Take 81 mg by mouth daily  , Disp: , Rfl:     carvedilol (COREG) 12 5 mg tablet, take 1 tablet by mouth twice a day with food, Disp: 180 tablet, Rfl: 1    ergocalciferol (ERGOCALCIFEROL) 26825 units capsule, take 1 capsule every  week, Disp: , Rfl:     escitalopram (LEXAPRO) 5 mg tablet, Take 1 tablet (5 mg total) by mouth daily, Disp: 30 tablet, Rfl: 0    furosemide (LASIX) 20 mg tablet, take 1 tablet by mouth once daily PRN, Disp: , Rfl:     JANUMET -1000 MG TB24, Take 1 tablet by mouth daily, Disp: , Rfl: 0    lisinopril (ZESTRIL) 10 mg tablet, Take 1 tablet (10 mg total) by mouth daily at bedtime, Disp: 90 tablet, Rfl: 3    methimazole (TAPAZOLE) 5 mg tablet, Take 1/2 tab BID by mouth daily, Disp: 30 tablet, Rfl: 0    Multiple Vitamins-Minerals (MULTIVITAMIN ADULT PO), Take 1 tablet po once daily , Disp: , Rfl:     PHARMACIST CHOICE ALCOHOL 70 % PADS, USE 3 TIMES A DAY AFTER CHECKING BLOOD GLUCOSE, Disp: , Rfl: 3    pregabalin (LYRICA) 75 mg capsule, Take 1 capsule (75 mg total) by mouth daily, Disp: 90 capsule, Rfl: 0    simvastatin (ZOCOR) 40 mg tablet, take 1 tablet (40MG)  by oral route  every day in the evening, Disp: , Rfl:     SITagliptin-MetFORMIN HCl ER (JANUMET XR)  MG TB24, Take 1 tablet by mouth daily  , Disp: , Rfl: ALLERGIES:  No Known Allergies    *-*-*-*-*-*-*-*-*-*-*-*-*-*-*-*-*-*-*-*-*-*-*-*-*-*-*-*-*-*-*-*-*-*-*-*-*-*-*-*-*-*-*-*-*-*-*-*-*-*-*-*-*-*-    LABORATORY DATA:  I have personally reviewed pertinent labs      Sodium   Date Value Ref Range Status   06/14/2018 140 137 - 147 MEQ/L Final   05/07/2018 143 137 - 147 MEQ/L Final     Potassium   Date Value Ref Range Status   07/31/2018 4 3 3 5 - 5 3 mmol/L Final   06/14/2018 4 5 3 6 - 5 0 MEQ/L Final   05/07/2018 4 5 3 6 - 5 0 MEQ/L Final     Chloride   Date Value Ref Range Status   07/31/2018 107 98 - 110 mmol/L Final   06/14/2018 104 97 - 108 MEQ/L Final   05/07/2018 110 (H) 97 - 108 MEQ/L Final     CO2   Date Value Ref Range Status   07/31/2018 26 20 - 31 mmol/L Final   06/14/2018 27 22 - 30 MMOL/L Final   05/07/2018 23 22 - 30 MMOL/L Final     Anion Gap   Date Value Ref Range Status   06/14/2018 9 5 - 14 MMOL/L Final   05/07/2018 10 5 - 14 MMOL/L Final     BUN   Date Value Ref Range Status   07/31/2018 15 7 - 25 mg/dL Final   06/14/2018 13 5 - 25 MG/DL Final   05/07/2018 19 5 - 25 MG/DL Final     Glucose   Date Value Ref Range Status   06/14/2018 103 (H) 70 - 99 MG/DL Final   05/07/2018 155 (H) 70 - 99 MG/DL Final     SL AMB CALCIUM   Date Value Ref Range Status   07/31/2018 9 3 8 6 - 10 4 mg/dL Final     Calcium   Date Value Ref Range Status   06/14/2018 9 4 8 4 - 10 2 MG/DL Final   05/07/2018 9 3 8 4 - 10 2 MG/DL Final     AST   Date Value Ref Range Status   05/07/2018 20 14 - 36 U/L Final     ALT   Date Value Ref Range Status   05/07/2018 23 9 - 52 U/L Final     Alkaline Phosphatase   Date Value Ref Range Status   07/31/2018 60 33 - 130 U/L Final   05/07/2018 65 43 - 122 U/L Final     Total Protein   Date Value Ref Range Status   05/07/2018 6 6 5 9 - 8 4 G/DL Final     Total Bilirubin   Date Value Ref Range Status   05/07/2018 0 2 <1 3 mg/dL Final     WBC   Date Value Ref Range Status   06/14/2018 5 3 4 5 - 11 0 K/MCL Final     White Blood Cell Count   Date Value Ref Range Status   07/31/2018 5 8 3 8 - 10 8 Thousand/uL Final     Hemoglobin   Date Value Ref Range Status   07/31/2018 10 0 (L) 11 7 - 15 5 g/dL Final   06/14/2018 10 2 (L) 12 0 - 16 0 G/DL Final     Platelet Count   Date Value Ref Range Status   07/31/2018 167 140 - 400 Thousand/uL Final     Platelets   Date Value Ref Range Status   06/14/2018 149 (L) 150 - 450 K/MCL Final     No results found for: PT, PTT, INR  No results found for: CKMB, BNP, DIGOXIN  TSH   Date Value Ref Range Status   07/31/2018 1 43 0 40 - 4 50 mIU/L Final     HDL   Date Value Ref Range Status   07/31/2018 63 >50 mg/dL Final     Triglycerides   Date Value Ref Range Status   07/31/2018 139 <150 mg/dL Final      Hemoglobin A1C   Date Value Ref Range Status   07/31/2018 6 6 (H) <5 7 % of total Hgb Final     Comment:     For someone without known diabetes, a hemoglobin A1c  value of 6 5% or greater indicates that they may have   diabetes and this should be confirmed with a follow-up   test      For someone with known diabetes, a value <7% indicates   that their diabetes is well controlled and a value   greater than or equal to 7% indicates suboptimal   control  A1c targets should be individualized based on   duration of diabetes, age, comorbid conditions, and   other considerations  Currently, no consensus exists regarding use of  hemoglobin A1c for diagnosis of diabetes for children  No results found for: Pino Feathers, GRAMSTAIN, URINECX, WOUNDCULT, BODYFLUIDCUL, MRSACULTURE, INFLUAPCR, INFLUBPCR, RSVPCR, LEGIONELLAUR, CDIFFTOXINB    *-*-*-*-*-*-*-*-*-*-*-*-*-*-*-*-*-*-*-*-*-*-*-*-*-*-*-*-*-*-*-*-*-*-*-*-*-*-*-*-*-*-*-*-*-*-*-*-*-*-*-*-*-*-  PREVIOUS CARDIOLOGY & RADIOLOGY RESULTS:  No results found for this or any previous visit  No results found for this or any previous visit  No results found for this or any previous visit  No results found for this or any previous visit    Us Thyroid    Result Date: 12/4/2018  Impression: No nodule meets current ACR criteria for requiring biopsy but followup ultrasound is recommended in 1 year  Reference: ACR Thyroid Imaging, Reporting and Data System (TI-RADS): White Paper of the [employer]  J AM Yakelin Radiol 4046;55:338-614  (additional recommendations based on American Thyroid Association 2015 guidelines ) Workstation performed: [id number]     Us Pelvis Complete W Transvaginal    Result Date: 12/4/2018  Impression: 1   1 8 cm intramural fibroid within the posterior uterus  2   There is a small amount of simple fluid within the endometrial canal   Consider follow-up ultrasound in 8-12 weeks to evaluate for stability/resolution  No endometrial thickening  Workstation performed: [id number]        *-*-*-*-*-*-*-*-*-*-*-*-*-*-*-*-*-*-*-*-*-*-*-*-*-*-*-*-*-*-*-*-*-*-*-*-*-*-*-*-*-*-*-*-*-*-*-*-*-*-*-*-*-*-  SIGNATURES:   @YVP@   Zaid Truong MD     CC:   MD Song Mcmahan MD   *-*-*-*-*-*-*-*-*-*-*-*-*-*-*-*-*-*-*-*-*-*-*-*-*-*-*-*-*-*-*-*-*-*-*-*-*-*-*-*-*-*-*-*-*-*-*-*-*-*-*-*-*-*-    Social History     Social History    Marital status:      Spouse name: N/A    Number of children: N/A    Years of education: N/A     Occupational History    Not on file       Social History Main Topics    Smoking status: Never Smoker    Smokeless tobacco: Never Used      Comment: no passive smoke exposure    Alcohol use No    Drug use: No    Sexual activity: No     Other Topics Concern    Not on file     Social History Narrative    No narrative on file      Family History   Problem Relation Age of Onset    Diabetes Mother     Colon cancer Family      Past Surgical History:   Procedure Laterality Date    APPENDECTOMY      CHOLECYSTECTOMY      UMBILICAL HERNIA REPAIR

## 2019-01-02 NOTE — ASSESSMENT & PLAN NOTE
Blood pressure is reasonably well controlled  Last echocardiogram in 2017 showed normal left ventricular function  - we are lowering the dose of lisinopril to 10 mg daily as long as this would maintain her blood pressure consistently less than 130/80   - we are continuing carvedilol and other medications  - Dietary and medical compliance are reinforced  - Advised  to report any concerning symptoms such as chest pain, shortness of breath, decline in exercise tolerance or presyncope/syncope

## 2019-01-02 NOTE — ASSESSMENT & PLAN NOTE
Tolerating low-dose simvastatin therapy  No recent lipid profile available  Will need periodic monitoring of LFTs and lipid profile

## 2019-01-04 RX ORDER — ESCITALOPRAM OXALATE 5 MG/1
5 TABLET ORAL DAILY
Qty: 30 TABLET | Refills: 1 | Status: SHIPPED | OUTPATIENT
Start: 2019-01-04 | End: 2019-02-27 | Stop reason: ALTCHOICE

## 2019-01-17 ENCOUNTER — OFFICE VISIT (OUTPATIENT)
Dept: FAMILY MEDICINE CLINIC | Facility: CLINIC | Age: 84
End: 2019-01-17
Payer: COMMERCIAL

## 2019-01-17 VITALS
TEMPERATURE: 97.2 F | OXYGEN SATURATION: 98 % | SYSTOLIC BLOOD PRESSURE: 140 MMHG | RESPIRATION RATE: 20 BRPM | WEIGHT: 136.2 LBS | HEART RATE: 72 BPM | DIASTOLIC BLOOD PRESSURE: 60 MMHG | BODY MASS INDEX: 29.47 KG/M2

## 2019-01-17 DIAGNOSIS — N28.9 ABNORMAL RENAL FUNCTION: ICD-10-CM

## 2019-01-17 DIAGNOSIS — D56.3 THALASSEMIA MINOR: ICD-10-CM

## 2019-01-17 DIAGNOSIS — F41.9 ANXIETY: ICD-10-CM

## 2019-01-17 DIAGNOSIS — I65.23 BILATERAL CAROTID ARTERY STENOSIS: ICD-10-CM

## 2019-01-17 DIAGNOSIS — M54.10 RADICULOPATHY OF LEG: Primary | ICD-10-CM

## 2019-01-17 DIAGNOSIS — R79.89 LOW VITAMIN D LEVEL: ICD-10-CM

## 2019-01-17 DIAGNOSIS — R93.89 ABNORMAL PELVIC ULTRASOUND: ICD-10-CM

## 2019-01-17 DIAGNOSIS — K57.30 DIVERTICULOSIS OF LARGE INTESTINE WITHOUT HEMORRHAGE: ICD-10-CM

## 2019-01-17 DIAGNOSIS — E04.9 NON-TOXIC NODULAR GOITER: ICD-10-CM

## 2019-01-17 DIAGNOSIS — R82.90 ABNORMAL URINE FINDING: ICD-10-CM

## 2019-01-17 PROCEDURE — 99214 OFFICE O/P EST MOD 30 MIN: CPT | Performed by: FAMILY MEDICINE

## 2019-01-17 PROCEDURE — 1160F RVW MEDS BY RX/DR IN RCRD: CPT | Performed by: FAMILY MEDICINE

## 2019-01-17 RX ORDER — METHOCARBAMOL 750 MG/1
TABLET, FILM COATED ORAL
Refills: 0 | COMMUNITY
Start: 2019-01-14 | End: 2019-02-27 | Stop reason: ALTCHOICE

## 2019-01-17 RX ORDER — CEPHALEXIN 500 MG/1
500 CAPSULE ORAL 2 TIMES DAILY
COMMUNITY
Start: 2019-01-13 | End: 2019-01-20

## 2019-01-17 NOTE — PROGRESS NOTES
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  Comments:  Increase fiber intake  Non-toxic nodular goiter  Comments:  Check thyroid ultrasound in 1 year    Bilateral carotid artery stenosis  Comments:  Check carotid ultrasound in 1 year    Abnormal pelvic ultrasound  Comments:  Positive fluid inside the uterus  Recheck pelvic ultrasound in 1 month    Low vitamin D level  Comments:  Check vitamin-D level  Order exist    Anxiety  Comments:  Controlled  Continue Lexapro  Other orders  -     cephalexin (KEFLEX) 500 mg capsule; Take 500 mg by mouth 2 (two) times a day  -     methocarbamol (ROBAXIN) 750 mg tablet; take 1 tablet by mouth four times a day if needed for muscle spasm            Subjective:     Patient ID: Maynor Crowe is a 80 y o  female      Post ER  Right leg radiculopathy  Patient stated on January 13, 2019 she went to the emergency room at [facility] because she developed severe pain started at the right buttock radiating to the right leg  With the slight numbness  With standing  Patient denied injury  Pain was sharp  Was placed on a muscle relaxant and her pain now is much better  And has no further numbness  Patient also was told she had urinary tract infection at the ER and they gave her Keflex  Patient denied dysuria, urinary frequency or hematuria    Follow-up chronic medical  Uterine fibroid  Denied pelvic pain or vaginal bleeding  Carotid stenosis  Denied loss of vision, slurred speech, weakness or numbness  Hypertension  Denied headache or dizziness  Thyroid nodule    Denied neck mass   Anxiety well controlled with Lexapro  Denied depression  Denied side effect home of the medication  Test results  Labs ordered by last office visit not done  Pelvic ultrasound  Arterial Doppler of the lower extremities  Thyroid ultrasound  Carotid ultrasound    Discussed result with patient    Test done at the [address] [place] [address] on January 13, 2018  Labs  CT scan of the abdomen and pelvis  Lumbar spine x-ray  All discussed with patient        Review of Systems   Constitutional: Negative for activity change, appetite change, chills, fatigue, fever and unexpected weight change  HENT: Negative for congestion, ear pain, sinus pressure and sore throat  Eyes: Negative for visual disturbance  Respiratory: Negative for cough, chest tightness, shortness of breath and wheezing  Cardiovascular: Negative for chest pain, palpitations and leg swelling  Gastrointestinal: Negative for abdominal pain, blood in stool, constipation, diarrhea, nausea and vomiting  Genitourinary: Negative for dysuria, frequency, hematuria and urgency  Musculoskeletal: Positive for back pain  Negative for arthralgias, gait problem, joint swelling, myalgias and neck pain  Skin: Negative for rash  Neurological: Negative for dizziness, tremors, seizures, syncope, weakness, light-headedness and headaches  Hematological: Negative for adenopathy  Does not bruise/bleed easily  Psychiatric/Behavioral: Negative for behavioral problems, confusion, dysphoric mood and sleep disturbance  Objective:     Physical Exam   Constitutional: She is oriented to person, place, and time  She appears well-developed and well-nourished  No distress  HENT:   Head: Normocephalic and atraumatic  Eyes: Pupils are equal, round, and reactive to light  Conjunctivae and EOM are normal  No scleral icterus  Neck: No JVD present  No thyromegaly present  Cardiovascular: Normal rate, regular rhythm and normal heart sounds      No murmur heard  Extremities  No edema   Pulmonary/Chest: Effort normal and breath sounds normal    Abdominal: Soft  Bowel sounds are normal  She exhibits no mass  There is no tenderness  There is no rebound and no guarding  No hernia  No CVA tenderness   Musculoskeletal:   Lumbar spine no tenderness  Negative leg raising bilaterally  Right hip for range of motion  Lymphadenopathy:     She has no cervical adenopathy  Neurological: She is alert and oriented to person, place, and time  She displays normal reflexes  No cranial nerve deficit  She exhibits normal muscle tone  Coordination normal    Skin: No rash noted  Psychiatric: She has a normal mood and affect   Her behavior is normal  Judgment and thought content normal

## 2019-01-25 ENCOUNTER — APPOINTMENT (OUTPATIENT)
Dept: LAB | Facility: HOSPITAL | Age: 84
End: 2019-01-25
Payer: COMMERCIAL

## 2019-01-25 DIAGNOSIS — N28.9 ABNORMAL RENAL FUNCTION: ICD-10-CM

## 2019-01-25 DIAGNOSIS — R82.90 ABNORMAL URINE FINDING: ICD-10-CM

## 2019-01-25 LAB
ANION GAP SERPL CALCULATED.3IONS-SCNC: 10 MMOL/L (ref 4–13)
BUN SERPL-MCNC: 21 MG/DL (ref 5–25)
CALCIUM SERPL-MCNC: 9.3 MG/DL (ref 8.3–10.1)
CHLORIDE SERPL-SCNC: 106 MMOL/L (ref 100–108)
CO2 SERPL-SCNC: 25 MMOL/L (ref 21–32)
CREAT SERPL-MCNC: 0.82 MG/DL (ref 0.6–1.3)
GFR SERPL CREATININE-BSD FRML MDRD: 66 ML/MIN/1.73SQ M
GLUCOSE P FAST SERPL-MCNC: 113 MG/DL (ref 65–99)
POTASSIUM SERPL-SCNC: 4.2 MMOL/L (ref 3.5–5.3)
SODIUM SERPL-SCNC: 141 MMOL/L (ref 136–145)

## 2019-01-25 PROCEDURE — 87086 URINE CULTURE/COLONY COUNT: CPT

## 2019-01-25 PROCEDURE — 36415 COLL VENOUS BLD VENIPUNCTURE: CPT

## 2019-01-25 PROCEDURE — 80048 BASIC METABOLIC PNL TOTAL CA: CPT

## 2019-01-26 LAB — BACTERIA UR CULT: NORMAL

## 2019-02-11 DIAGNOSIS — G89.29 CHRONIC MIDLINE LOW BACK PAIN, WITH SCIATICA PRESENCE UNSPECIFIED: ICD-10-CM

## 2019-02-11 DIAGNOSIS — M54.5 CHRONIC MIDLINE LOW BACK PAIN, WITH SCIATICA PRESENCE UNSPECIFIED: ICD-10-CM

## 2019-02-27 ENCOUNTER — OFFICE VISIT (OUTPATIENT)
Dept: FAMILY MEDICINE CLINIC | Facility: CLINIC | Age: 84
End: 2019-02-27
Payer: COMMERCIAL

## 2019-02-27 VITALS
HEART RATE: 76 BPM | RESPIRATION RATE: 20 BRPM | SYSTOLIC BLOOD PRESSURE: 100 MMHG | OXYGEN SATURATION: 97 % | TEMPERATURE: 97.2 F | DIASTOLIC BLOOD PRESSURE: 60 MMHG | WEIGHT: 134.6 LBS | BODY MASS INDEX: 29.13 KG/M2

## 2019-02-27 DIAGNOSIS — R79.89 LOW VITAMIN D LEVEL: ICD-10-CM

## 2019-02-27 DIAGNOSIS — M54.41 ACUTE RIGHT-SIDED LOW BACK PAIN WITH RIGHT-SIDED SCIATICA: Primary | ICD-10-CM

## 2019-02-27 DIAGNOSIS — R93.5 ABNORMAL ULTRASOUND OF UTERUS: ICD-10-CM

## 2019-02-27 DIAGNOSIS — K57.30 DIVERTICULOSIS OF LARGE INTESTINE WITHOUT HEMORRHAGE: ICD-10-CM

## 2019-02-27 DIAGNOSIS — M51.36 DEGENERATIVE DISC DISEASE, LUMBAR: ICD-10-CM

## 2019-02-27 DIAGNOSIS — D56.3 THALASSEMIA MINOR: ICD-10-CM

## 2019-02-27 DIAGNOSIS — R82.90 ABNORMAL URINE FINDING: ICD-10-CM

## 2019-02-27 DIAGNOSIS — N28.9 ABNORMAL RENAL FUNCTION: ICD-10-CM

## 2019-02-27 DIAGNOSIS — J02.9 ACUTE PHARYNGITIS, UNSPECIFIED ETIOLOGY: ICD-10-CM

## 2019-02-27 LAB — S PYO AG THROAT QL: NEGATIVE

## 2019-02-27 PROCEDURE — 99214 OFFICE O/P EST MOD 30 MIN: CPT | Performed by: FAMILY MEDICINE

## 2019-02-27 PROCEDURE — 1036F TOBACCO NON-USER: CPT | Performed by: FAMILY MEDICINE

## 2019-02-27 PROCEDURE — 87880 STREP A ASSAY W/OPTIC: CPT | Performed by: FAMILY MEDICINE

## 2019-02-27 PROCEDURE — 1101F PT FALLS ASSESS-DOCD LE1/YR: CPT | Performed by: FAMILY MEDICINE

## 2019-02-27 NOTE — PROGRESS NOTES
Assessment/Plan:          Diagnoses and all orders for this visit:    Acute right-sided low back pain with right-sided sciatica  Comments:  Resolved  Patient to call if recur    Acute pharyngitis, unspecified etiology  Comments: To drink warm liquids  To call if not better or worse  Rapid strep test is negative  Orders:  -     POCT rapid strepA    Abnormal urine finding  -     Urinalysis with reflex to microscopic    Abnormal ultrasound of uterus  -     Cancel: US pelvis complete non OB; Future  -     US pelvis complete w transvaginal; Future    Abnormal renal function  -     Basic metabolic panel; Future    Degenerative disc disease, lumbar    Diverticulosis of large intestine without hemorrhage  Comments:  Increase fiber intake    Thalassemia minor  Comments:  Red blood count is stable    Low vitamin D level  Comments: Following with Endocrinology            Subjective:     Patient ID: Supriya Martinez is a 80 y o  female      Post ER visit on January 13, 2019  Back pain  Patient developed acute pain located at the right lower back when she bend down pain radiated to the right leg  Was constant severe sharp  Started a few days before she ended up in the emergency room  Patient stated her pain resolved  Denied weakness or numbness of the lower extremities  Sore throat  Only started 2 days ago  Mild  Intermittent  Only has discomfort no real pain  Patient denied runny runny nose, denied cough  Denied fever and chills  Chronic medical problem  Anemia  Denied fatigue, shortness of breath or dizziness  Abnormal pelvic ultrasound  Patient denied pelvic pain, vaginal discharge,  Diabetes  Well controlled  Denied polyuria or polydipsia  Following with Endo  Low vitamin-D  Denied depression or falling  Also following with Endo  ER report on January 13, 2019 noted patient had labs done a abdominal and pelvic CT scan and x-ray of the lumbar spine reviewed results with patient      Lab done on January 25, 2019 reviewed results with patient      Review of Systems   Constitutional: Negative for activity change, appetite change, chills, fatigue, fever and unexpected weight change  HENT: Negative for congestion, ear pain, sinus pressure and sore throat  Eyes: Negative for visual disturbance  Respiratory: Negative for cough, chest tightness, shortness of breath and wheezing  Cardiovascular: Negative for chest pain, palpitations and leg swelling  Gastrointestinal: Negative for abdominal pain, blood in stool, constipation, diarrhea, nausea and vomiting  Genitourinary: Negative for dysuria, frequency, hematuria and urgency  Musculoskeletal: Negative for arthralgias, back pain, gait problem, joint swelling, myalgias and neck pain  Skin: Negative for rash  Neurological: Negative for dizziness, tremors, seizures, syncope, weakness, light-headedness and headaches  Hematological: Negative for adenopathy  Does not bruise/bleed easily  Psychiatric/Behavioral: Negative for behavioral problems, confusion, dysphoric mood and sleep disturbance  Objective:     Physical Exam   Constitutional: She is oriented to person, place, and time  She appears well-developed and well-nourished  No distress  HENT:   Head: Normocephalic and atraumatic  Right Ear: External ear normal    Left Ear: External ear normal    Nose: Nose normal    Mouth/Throat: Oropharynx is clear and moist  No oropharyngeal exudate  Eyes: Pupils are equal, round, and reactive to light  Conjunctivae and EOM are normal  No scleral icterus  Neck: Neck supple  No JVD present  No thyromegaly present  Cardiovascular: Normal rate, regular rhythm and normal heart sounds  No murmur heard  Pulses:       Carotid pulses are 3+ on the right side, and 3+ on the left side  Extremities  No edema   Pulmonary/Chest: Effort normal and breath sounds normal    Abdominal: Soft  Bowel sounds are normal  She exhibits no distension and no mass  There is no tenderness  There is no rebound and no guarding  No hernia  Musculoskeletal:   Back  No spine or muscle tenderness  Negative leg raising bilaterally  Lymphadenopathy:     She has no cervical adenopathy  Neurological: She is alert and oriented to person, place, and time  No cranial nerve deficit or sensory deficit  She exhibits normal muscle tone  Coordination normal    Skin: No rash noted  Psychiatric: She has a normal mood and affect   Her behavior is normal  Judgment and thought content normal

## 2019-03-02 PROBLEM — R93.5 ABNORMAL ULTRASOUND OF UTERUS: Status: ACTIVE | Noted: 2019-03-02

## 2019-03-02 PROBLEM — R82.90 ABNORMAL URINE FINDING: Status: ACTIVE | Noted: 2019-03-02

## 2019-03-02 PROBLEM — M51.36 DEGENERATIVE DISC DISEASE, LUMBAR: Status: ACTIVE | Noted: 2019-03-02

## 2019-03-11 ENCOUNTER — HOSPITAL ENCOUNTER (OUTPATIENT)
Dept: ULTRASOUND IMAGING | Facility: HOSPITAL | Age: 84
Discharge: HOME | End: 2019-03-11
Attending: FAMILY MEDICINE
Payer: COMMERCIAL

## 2019-03-11 ENCOUNTER — APPOINTMENT (OUTPATIENT)
Dept: LAB | Facility: HOSPITAL | Age: 84
End: 2019-03-11
Attending: FAMILY MEDICINE
Payer: COMMERCIAL

## 2019-03-11 DIAGNOSIS — R79.89 LOW SERUM VITAMIN D: ICD-10-CM

## 2019-03-11 DIAGNOSIS — R93.5 ABNORMAL ULTRASOUND OF UTERUS: ICD-10-CM

## 2019-03-11 DIAGNOSIS — N28.9 ABNORMAL RENAL FUNCTION: ICD-10-CM

## 2019-03-11 LAB
25(OH)D3 SERPL-MCNC: 110.8 NG/ML (ref 30–100)
ANION GAP SERPL CALCULATED.3IONS-SCNC: 9 MMOL/L (ref 5–14)
BACTERIA UR QL AUTO: ABNORMAL /HPF
BILIRUB UR QL STRIP: NEGATIVE
BUN SERPL-MCNC: 14 MG/DL (ref 5–25)
CALCIUM SERPL-MCNC: 9.3 MG/DL (ref 8.4–10.2)
CHLORIDE SERPL-SCNC: 104 MMOL/L (ref 97–108)
CLARITY UR: CLEAR
CO2 SERPL-SCNC: 23 MMOL/L (ref 22–30)
COLOR UR: ABNORMAL
CREAT SERPL-MCNC: 0.76 MG/DL (ref 0.6–1.2)
GFR SERPL CREATININE-BSD FRML MDRD: 73 ML/MIN/1.73SQ M
GLUCOSE P FAST SERPL-MCNC: 118 MG/DL (ref 70–99)
GLUCOSE UR STRIP-MCNC: NEGATIVE MG/DL
HGB UR QL STRIP.AUTO: NEGATIVE
KETONES UR STRIP-MCNC: NEGATIVE MG/DL
LEUKOCYTE ESTERASE UR QL STRIP: 100
NITRITE UR QL STRIP: NEGATIVE
NON-SQ EPI CELLS URNS QL MICRO: ABNORMAL /HPF
PH UR STRIP.AUTO: 5 [PH]
POTASSIUM SERPL-SCNC: 4.7 MMOL/L (ref 3.6–5)
PROT UR STRIP-MCNC: NEGATIVE MG/DL
RBC #/AREA URNS AUTO: ABNORMAL /HPF
SODIUM SERPL-SCNC: 136 MMOL/L (ref 137–147)
SP GR UR STRIP.AUTO: 1.01 (ref 1–1.04)
UROBILINOGEN UA: NEGATIVE MG/DL
WBC #/AREA URNS AUTO: ABNORMAL /HPF

## 2019-03-11 PROCEDURE — 82306 VITAMIN D 25 HYDROXY: CPT

## 2019-03-11 PROCEDURE — 81001 URINALYSIS AUTO W/SCOPE: CPT | Performed by: FAMILY MEDICINE

## 2019-03-11 PROCEDURE — 36415 COLL VENOUS BLD VENIPUNCTURE: CPT

## 2019-03-11 PROCEDURE — 76830 TRANSVAGINAL US NON-OB: CPT

## 2019-03-11 PROCEDURE — 76856 US EXAM PELVIC COMPLETE: CPT

## 2019-03-11 PROCEDURE — 80048 BASIC METABOLIC PNL TOTAL CA: CPT

## 2019-03-13 ENCOUNTER — TELEPHONE (OUTPATIENT)
Dept: FAMILY MEDICINE CLINIC | Facility: CLINIC | Age: 84
End: 2019-03-13

## 2019-03-13 NOTE — TELEPHONE ENCOUNTER
Called [employer] at [phone], spoke to Tomas, she set up patients transportation for 3/27/19

## 2019-03-13 NOTE — TELEPHONE ENCOUNTER
Left detailed message on patients answering machine advising that her transportation is set up and there will be a form mailed to her home to sign and bring along to her appointment

## 2019-03-13 NOTE — TELEPHONE ENCOUNTER
Yodit Tran called stating that patient needs our office to schedule her transportation for her appointment

## 2019-03-18 ENCOUNTER — TELEPHONE (OUTPATIENT)
Dept: FAMILY MEDICINE CLINIC | Facility: CLINIC | Age: 84
End: 2019-03-18

## 2019-03-18 NOTE — TELEPHONE ENCOUNTER
Notes recorded by Hector Jones MD on 3/14/2019 at 2:33 PM EDT  Pelvic ultrasound , positive for fibroid   And persistent small amount of fluid within the endometrial canal unchanged from prior exam referred patient to [facility]

## 2019-03-27 ENCOUNTER — OFFICE VISIT (OUTPATIENT)
Dept: FAMILY MEDICINE CLINIC | Facility: CLINIC | Age: 84
End: 2019-03-27
Payer: COMMERCIAL

## 2019-03-27 VITALS
HEART RATE: 68 BPM | SYSTOLIC BLOOD PRESSURE: 115 MMHG | TEMPERATURE: 97.4 F | WEIGHT: 134 LBS | OXYGEN SATURATION: 99 % | DIASTOLIC BLOOD PRESSURE: 68 MMHG | BODY MASS INDEX: 28.91 KG/M2 | HEIGHT: 57 IN

## 2019-03-27 DIAGNOSIS — L30.9 DERMATITIS: ICD-10-CM

## 2019-03-27 DIAGNOSIS — E11.69 TYPE 2 DIABETES MELLITUS WITH OTHER SPECIFIED COMPLICATION, WITHOUT LONG-TERM CURRENT USE OF INSULIN (HCC): ICD-10-CM

## 2019-03-27 DIAGNOSIS — R93.89 ABNORMAL PELVIC ULTRASOUND: ICD-10-CM

## 2019-03-27 DIAGNOSIS — E67.3 HIGH VITAMIN D LEVEL: ICD-10-CM

## 2019-03-27 DIAGNOSIS — R82.90 ABNORMAL URINALYSIS: ICD-10-CM

## 2019-03-27 DIAGNOSIS — N18.9 CHRONIC RENAL IMPAIRMENT, UNSPECIFIED CKD STAGE: ICD-10-CM

## 2019-03-27 DIAGNOSIS — D21.9 FIBROID: ICD-10-CM

## 2019-03-27 DIAGNOSIS — Z00.00 MEDICARE ANNUAL WELLNESS VISIT, INITIAL: Primary | ICD-10-CM

## 2019-03-27 PROCEDURE — G0438 PPPS, INITIAL VISIT: HCPCS | Performed by: FAMILY MEDICINE

## 2019-03-27 PROCEDURE — 1170F FXNL STATUS ASSESSED: CPT | Performed by: FAMILY MEDICINE

## 2019-03-27 PROCEDURE — 87186 SC STD MICRODIL/AGAR DIL: CPT | Performed by: FAMILY MEDICINE

## 2019-03-27 PROCEDURE — 1125F AMNT PAIN NOTED PAIN PRSNT: CPT | Performed by: FAMILY MEDICINE

## 2019-03-27 PROCEDURE — 1160F RVW MEDS BY RX/DR IN RCRD: CPT | Performed by: FAMILY MEDICINE

## 2019-03-27 PROCEDURE — 99214 OFFICE O/P EST MOD 30 MIN: CPT | Performed by: FAMILY MEDICINE

## 2019-03-27 PROCEDURE — 87086 URINE CULTURE/COLONY COUNT: CPT | Performed by: FAMILY MEDICINE

## 2019-03-27 PROCEDURE — 87077 CULTURE AEROBIC IDENTIFY: CPT | Performed by: FAMILY MEDICINE

## 2019-03-27 PROCEDURE — 1036F TOBACCO NON-USER: CPT | Performed by: FAMILY MEDICINE

## 2019-03-27 RX ORDER — MAGNESIUM 30 MG
30 TABLET ORAL 2 TIMES DAILY
COMMUNITY

## 2019-03-27 RX ORDER — NYSTATIN AND TRIAMCINOLONE ACETONIDE 100000; 1 [USP'U]/G; MG/G
OINTMENT TOPICAL 2 TIMES DAILY
Qty: 30 G | Refills: 0 | Status: SHIPPED | OUTPATIENT
Start: 2019-03-27 | End: 2019-09-19

## 2019-03-27 RX ORDER — FUROSEMIDE 20 MG/1
1 TABLET ORAL EVERY 24 HOURS
COMMUNITY
Start: 2017-05-23 | End: 2019-03-27 | Stop reason: SDUPTHER

## 2019-03-30 LAB — BACTERIA UR CULT: ABNORMAL

## 2019-03-31 DIAGNOSIS — N39.0 URINARY TRACT INFECTION WITHOUT HEMATURIA, SITE UNSPECIFIED: Primary | ICD-10-CM

## 2019-03-31 RX ORDER — CIPROFLOXACIN 250 MG/1
250 TABLET, FILM COATED ORAL EVERY 12 HOURS SCHEDULED
Qty: 6 TABLET | Refills: 0 | Status: SHIPPED | OUTPATIENT
Start: 2019-03-31 | End: 2019-04-03

## 2019-04-02 ENCOUNTER — TELEPHONE (OUTPATIENT)
Dept: FAMILY MEDICINE CLINIC | Facility: CLINIC | Age: 84
End: 2019-04-02

## 2019-04-02 DIAGNOSIS — N39.0 URINARY TRACT INFECTION WITHOUT HEMATURIA, SITE UNSPECIFIED: Primary | ICD-10-CM

## 2019-05-09 ENCOUNTER — TELEPHONE (OUTPATIENT)
Dept: FAMILY MEDICINE CLINIC | Facility: CLINIC | Age: 84
End: 2019-05-09

## 2019-05-09 DIAGNOSIS — M54.5 CHRONIC MIDLINE LOW BACK PAIN, WITH SCIATICA PRESENCE UNSPECIFIED: ICD-10-CM

## 2019-05-09 DIAGNOSIS — G89.29 CHRONIC MIDLINE LOW BACK PAIN, WITH SCIATICA PRESENCE UNSPECIFIED: ICD-10-CM

## 2019-06-27 ENCOUNTER — OFFICE VISIT (OUTPATIENT)
Dept: FAMILY MEDICINE CLINIC | Facility: CLINIC | Age: 84
End: 2019-06-27
Payer: COMMERCIAL

## 2019-06-27 VITALS
DIASTOLIC BLOOD PRESSURE: 62 MMHG | HEART RATE: 67 BPM | OXYGEN SATURATION: 96 % | WEIGHT: 135 LBS | BODY MASS INDEX: 29.12 KG/M2 | HEIGHT: 57 IN | SYSTOLIC BLOOD PRESSURE: 134 MMHG

## 2019-06-27 DIAGNOSIS — Z78.0 MENOPAUSE: ICD-10-CM

## 2019-06-27 DIAGNOSIS — R93.89 ABNORMAL PELVIC ULTRASOUND: ICD-10-CM

## 2019-06-27 DIAGNOSIS — I65.23 BILATERAL CAROTID ARTERY STENOSIS: Primary | ICD-10-CM

## 2019-06-27 DIAGNOSIS — Z87.39 HISTORY OF GOUT: ICD-10-CM

## 2019-06-27 DIAGNOSIS — R82.90 ABNORMAL URINE FINDING: ICD-10-CM

## 2019-06-27 DIAGNOSIS — E78.2 MIXED HYPERLIPIDEMIA: ICD-10-CM

## 2019-06-27 DIAGNOSIS — I10 ESSENTIAL HYPERTENSION: ICD-10-CM

## 2019-06-27 DIAGNOSIS — N18.9 CHRONIC RENAL IMPAIRMENT, UNSPECIFIED CKD STAGE: ICD-10-CM

## 2019-06-27 DIAGNOSIS — R09.89 DECREASED PEDAL PULSES: ICD-10-CM

## 2019-06-27 DIAGNOSIS — B35.1 ONYCHOMYCOSIS: ICD-10-CM

## 2019-06-27 DIAGNOSIS — D56.3 THALASSEMIA MINOR: ICD-10-CM

## 2019-06-27 PROCEDURE — 3078F DIAST BP <80 MM HG: CPT | Performed by: FAMILY MEDICINE

## 2019-06-27 PROCEDURE — 1036F TOBACCO NON-USER: CPT | Performed by: FAMILY MEDICINE

## 2019-06-27 PROCEDURE — 1160F RVW MEDS BY RX/DR IN RCRD: CPT | Performed by: FAMILY MEDICINE

## 2019-06-27 PROCEDURE — 99214 OFFICE O/P EST MOD 30 MIN: CPT | Performed by: FAMILY MEDICINE

## 2019-06-27 PROCEDURE — 3075F SYST BP GE 130 - 139MM HG: CPT | Performed by: FAMILY MEDICINE

## 2019-07-22 ENCOUNTER — HOSPITAL ENCOUNTER (OUTPATIENT)
Dept: BONE DENSITY | Facility: CLINIC | Age: 84
Discharge: HOME | End: 2019-07-22
Payer: COMMERCIAL

## 2019-07-22 ENCOUNTER — APPOINTMENT (OUTPATIENT)
Dept: LAB | Facility: HOSPITAL | Age: 84
End: 2019-07-22
Attending: FAMILY MEDICINE
Payer: COMMERCIAL

## 2019-07-22 ENCOUNTER — TELEPHONE (OUTPATIENT)
Dept: FAMILY MEDICINE CLINIC | Facility: CLINIC | Age: 84
End: 2019-07-22

## 2019-07-22 DIAGNOSIS — D56.3 THALASSEMIA MINOR: ICD-10-CM

## 2019-07-22 DIAGNOSIS — Z87.39 HISTORY OF GOUT: ICD-10-CM

## 2019-07-22 DIAGNOSIS — Z78.0 MENOPAUSE: ICD-10-CM

## 2019-07-22 DIAGNOSIS — E79.0 ELEVATED BLOOD URIC ACID LEVEL: Primary | ICD-10-CM

## 2019-07-22 LAB
BASOPHILS # BLD AUTO: 0 THOUSANDS/ΜL (ref 0–0.1)
BASOPHILS NFR BLD AUTO: 1 % (ref 0–1)
EOSINOPHIL # BLD AUTO: 0.1 THOUSAND/ΜL (ref 0–0.4)
EOSINOPHIL NFR BLD AUTO: 2 % (ref 0–6)
ERYTHROCYTE [DISTWIDTH] IN BLOOD BY AUTOMATED COUNT: 14.6 %
HCT VFR BLD AUTO: 32.9 % (ref 36–46)
HGB BLD-MCNC: 10.2 G/DL (ref 12–16)
HYPERCHROMIA BLD QL SMEAR: PRESENT
LYMPHOCYTES # BLD AUTO: 2 THOUSANDS/ΜL (ref 0.5–4)
LYMPHOCYTES NFR BLD AUTO: 31 % (ref 25–45)
MCH RBC QN AUTO: 22.6 PG (ref 26.8–34.3)
MCHC RBC AUTO-ENTMCNC: 31 G/DL (ref 31.4–37.4)
MCV RBC AUTO: 73 FL (ref 80–100)
MICROCYTES BLD QL AUTO: PRESENT
MONOCYTES # BLD AUTO: 0.5 THOUSAND/ΜL (ref 0.2–0.9)
MONOCYTES NFR BLD AUTO: 8 % (ref 1–10)
NEUTROPHILS # BLD AUTO: 3.8 THOUSANDS/ΜL (ref 1.8–7.8)
NEUTS SEG NFR BLD AUTO: 58 % (ref 45–65)
OVALOCYTES BLD QL SMEAR: PRESENT
PLATELET # BLD AUTO: 162 THOUSANDS/UL (ref 150–450)
PLATELET BLD QL SMEAR: ADEQUATE
PMV BLD AUTO: 10.6 FL (ref 8.9–12.7)
POIKILOCYTOSIS BLD QL SMEAR: PRESENT
RBC # BLD AUTO: 4.52 MILLION/UL (ref 4–5.2)
RBC MORPH BLD: NORMAL
TARGETS BLD QL SMEAR: PRESENT
URATE SERPL-MCNC: 8.4 MG/DL (ref 2.7–7.5)
WBC # BLD AUTO: 6.5 THOUSAND/UL (ref 4.31–10.16)

## 2019-07-22 PROCEDURE — 77080 DXA BONE DENSITY AXIAL: CPT

## 2019-07-22 PROCEDURE — 36415 COLL VENOUS BLD VENIPUNCTURE: CPT

## 2019-07-22 PROCEDURE — 84550 ASSAY OF BLOOD/URIC ACID: CPT

## 2019-07-22 PROCEDURE — 85025 COMPLETE CBC W/AUTO DIFF WBC: CPT

## 2019-07-24 ENCOUNTER — TELEPHONE (OUTPATIENT)
Dept: FAMILY MEDICINE CLINIC | Facility: CLINIC | Age: 84
End: 2019-07-24

## 2019-07-24 NOTE — TELEPHONE ENCOUNTER
----- Message from Marley Vaz MD sent at 7/22/2019  1:39 PM EDT -----  Anemia is stable    Give patient office visit for follow-up in 2 months   uric acid is elevated check 24 hour urine for uric acid

## 2019-07-25 ENCOUNTER — TRANSCRIBE ORDERS (OUTPATIENT)
Dept: ADMINISTRATIVE | Facility: HOSPITAL | Age: 84
End: 2019-07-25

## 2019-07-27 ENCOUNTER — APPOINTMENT (OUTPATIENT)
Dept: LAB | Facility: HOSPITAL | Age: 84
End: 2019-07-27
Attending: FAMILY MEDICINE
Payer: COMMERCIAL

## 2019-07-27 DIAGNOSIS — E79.0 ELEVATED BLOOD URIC ACID LEVEL: ICD-10-CM

## 2019-07-27 LAB
PERIOD: 24 HOURS
SPECIMEN VOL UR: 1650 ML
URATE 24H UR-MCNC: 242.55 MG/24 HRS (ref 150–990)

## 2019-07-27 PROCEDURE — 84560 ASSAY OF URINE/URIC ACID: CPT

## 2019-08-10 DIAGNOSIS — G89.29 CHRONIC MIDLINE LOW BACK PAIN, WITH SCIATICA PRESENCE UNSPECIFIED: ICD-10-CM

## 2019-08-10 DIAGNOSIS — M54.5 CHRONIC MIDLINE LOW BACK PAIN, WITH SCIATICA PRESENCE UNSPECIFIED: ICD-10-CM

## 2019-08-22 DIAGNOSIS — I10 ESSENTIAL HYPERTENSION: ICD-10-CM

## 2019-08-22 RX ORDER — CARVEDILOL 12.5 MG/1
TABLET ORAL
Qty: 180 TABLET | Refills: 1 | Status: SHIPPED | OUTPATIENT
Start: 2019-08-22 | End: 2019-09-19 | Stop reason: HOSPADM

## 2019-09-19 ENCOUNTER — OFFICE VISIT (OUTPATIENT)
Dept: CARDIOLOGY CLINIC | Facility: CLINIC | Age: 84
End: 2019-09-19
Payer: COMMERCIAL

## 2019-09-19 VITALS
RESPIRATION RATE: 16 BRPM | SYSTOLIC BLOOD PRESSURE: 112 MMHG | WEIGHT: 133 LBS | HEIGHT: 57 IN | DIASTOLIC BLOOD PRESSURE: 62 MMHG | HEART RATE: 72 BPM | BODY MASS INDEX: 28.69 KG/M2

## 2019-09-19 DIAGNOSIS — I10 ESSENTIAL HYPERTENSION: Primary | ICD-10-CM

## 2019-09-19 DIAGNOSIS — E78.2 MIXED HYPERLIPIDEMIA: ICD-10-CM

## 2019-09-19 PROBLEM — R07.89 CHEST WALL PAIN: Status: ACTIVE | Noted: 2019-09-19

## 2019-09-19 PROCEDURE — 99214 OFFICE O/P EST MOD 30 MIN: CPT | Performed by: INTERNAL MEDICINE

## 2019-09-19 PROCEDURE — 3078F DIAST BP <80 MM HG: CPT | Performed by: INTERNAL MEDICINE

## 2019-09-19 PROCEDURE — 3074F SYST BP LT 130 MM HG: CPT | Performed by: INTERNAL MEDICINE

## 2019-09-19 RX ORDER — METOPROLOL SUCCINATE 25 MG/1
25 TABLET, EXTENDED RELEASE ORAL DAILY
Qty: 90 TABLET | Refills: 3 | Status: SHIPPED | OUTPATIENT
Start: 2019-09-19

## 2019-09-19 NOTE — PATIENT INSTRUCTIONS
CARDIOLOGY ASSESSMENT & PLAN:  1  Essential hypertension  metoprolol succinate (TOPROL-XL) 25 mg 24 hr tablet   2  Mixed hyperlipidemia       Essential hypertension  Blood pressure is normal   However patient is requesting that she would prefer to take a once a day drug rather than twice daily  - we are discontinuing carvedilol   - we are beginning metoprolol succinate 25 mg once daily  - she is advised to continue the lisinopril 10 mg daily   - Dietary and medical compliance are reinforced  - Advised  to report any concerning symptoms such as chest pain, shortness of breath, decline in exercise tolerance or presyncope/syncope  Mixed hyperlipidemia  Is on simvastatin 40 mg daily  Lipids in July 2019 under control  Normal LFTs  Will continue current therapy  Chest wall pain  She has chest wall pain and some tenderness in the lateral region  No history of trauma  No palpable lesions or visible rash noted in the region  I am advising her to apply some warm compress alternating with cold application to the region  If symptoms persist she is advised to follow up with her primary care physician for further evaluation

## 2019-09-19 NOTE — PROGRESS NOTES
[facility]  [address] [facility], [place] [address]  Phone#  [phone]  Fax#  [phone]  *-*-*-*-*-*-*-*-*-*-*-*-*-*-*-*-*-*-*-*-*-*-*-*-*-*-*-*-*-*-*-*-*-*-*-*-*-*-*-*-*-*-*-*-*-*-*-*-*-*-*-*-*-*  ENCOUNTER DATE: 09/19/19 10:27 AM  PATIENT NAME: Kimber Cox   1935    [id number]  Age: 80 y o  Sex: female  AUTHOR: Ana Howard MD  PRIMARYCARE PHYSICIAN: Benjamin Tyler MD    DIAGNOSES:  1  Benign essential hypertension  2  Mixed dyslipidemia  3  History of chest pain  4  History of diabetes mellitus  5  Hypo and hyperthyroidism  6  Anemia  7  Degenerative joint disease  8  History of diverticulosis  9  Cervical radiculopathy  10 History of carotid artery disease  11  History of cholecystectomy and appendectomy  12  Vitamin-D deficiency  13  History of umbilical hernia    Dobutamine stress echocardiogram at [facility] in March 2017 was negative for ischemia  EF was reported as 60%  No significant valvular abnormality  CURRENT ECG:  No results found for this visit on 09/19/19  CARDIOLOGY ASSESSMENT & PLAN:  1  Essential hypertension  metoprolol succinate (TOPROL-XL) 25 mg 24 hr tablet   2  Mixed hyperlipidemia       Essential hypertension  Blood pressure is normal   However patient is requesting that she would prefer to take a once a day drug rather than twice daily  - we are discontinuing carvedilol   - we are beginning metoprolol succinate 25 mg once daily  - she is advised to continue the lisinopril 10 mg daily   - Dietary and medical compliance are reinforced  - Advised  to report any concerning symptoms such as chest pain, shortness of breath, decline in exercise tolerance or presyncope/syncope  Mixed hyperlipidemia  Is on simvastatin 40 mg daily  Lipids in July 2019 under control  Normal LFTs  Will continue current therapy  Chest wall pain  She has chest wall pain and some tenderness in the lateral region  No history of trauma    No palpable lesions or visible rash noted in the region  I am advising her to apply some warm compress alternating with cold application to the region  If symptoms persist she is advised to follow up with her primary care physician for further evaluation  INTERVAL HISTORY & HISTORY OF PRESENT ILLNESS:  Cintia Grajeda is here for follow-up regarding her cardiac comorbidities which include:  Hypertension, dyslipidemia and other comorbidities  Patient today reports that she has been dealing with pain in the left upper lateral chest wall region for last few weeks  Pain is present only when she is lying on the left side  There is no symptom when she is sitting up or walking but when she lies down she feels a constant dull pain which makes her feel uneasy  Also reports intermittent swelling and pain in the left his elbow region  Other than that no significant cardiac symptoms  There have been no recent active symptoms of chest discomfort or exertional angina  There is no dyspnea with usual activities or exertion  No orthopnea, PND or pedal edema  No palpitations, lightheadedness, presyncope, or syncope  Denies any recent hospitalizations or other illnesses  Reports being compliant with medications  REVIEW OF SYMPTOMS:    Positive for:  Left chest wall pain  Negative for: All remaining as reviewed below and in HPI      SYSTEM SYMPTOMS REVIEWED:  General--weight change, fever, night sweats  Respiratory--cough, wheezing, shortness of breath, sputum production  Cardiovascular--chest pain, syncope, dyspnea on exertion, edema, decline in exercise tolerance, claudication   Gastrointestinal--persistent vomiting, diarrhea, abdominal distention, blood in stool   Muscular or skeletal--joint pain or swelling   Neurologic--headaches, syncope, abnormal movement  Hematologic--history of easy bruising and bleeding   Endocrine--thyroid enlargement, heat or cold intolerance, polyuria   Psychiatric--anxiety, depression     *-*-*-*-*-*-*-*-*-*-*-*-*-*-*-*-*-*-*-*-*-*-*-*-*-*-*-*-*-*-*-*-*-*-*-*-*-*-*-*-*-*-*-*-*-*-*-*-*-*-*-*-*-*-  VITAL SIGNS:  Vitals:    09/19/19 0955   BP: 112/62   Pulse: 72   Resp: 16   Weight: 60 3 kg (133 lb)   Height: 4' 9" (1 448 m)       *-*-*-*-*-*-*-*-*-*-*-*-*-*-*-*-*-*-*-*-*-*-*-*-*-*-*-*-*-*-*-*-*-*-*-*-*-*-*-*-*-*-*-*-*-*-*-*-*-*-*-*-*-*-  PHYSICAL EXAM:  General Appearance:    Alert, cooperative, no distress, appears stated age   Head, Eyes, ENT:    No obvious abnormality, moist mucous mebranes  Neck:   Supple, no carotid bruit or JVD   Back:     Symmetric, no curvature  Lungs:     Respirations unlabored  Clear to auscultation bilaterally,    Chest wall:    No tenderness or deformity  No focal tenderness in the chest wall  No lesions concerning for zoster  Heart:    Regular rate and rhythm, S1 and S2 normal, no murmur, rub  or gallop     Abdomen:     Soft, non-tender, No obvious masses, or organomegaly   Extremities:   Extremities normal, no cyanosis or edema    Skin:   Skin color, texture, turgor normal, no rashes or lesions     *-*-*-*-*-*-*-*-*-*-*-*-*-*-*-*-*-*-*-*-*-*-*-*-*-*-*-*-*-*-*-*-*-*-*-*-*-*-*-*-*-*-*-*-*-*-*-*-*-*-*-*-*-*-  CURRENT MEDICATION LIST:    Current Outpatient Medications:     aspirin 81 MG tablet, Take 81 mg by mouth daily  , Disp: , Rfl:     furosemide (LASIX) 20 mg tablet, take 1 tablet by mouth once daily PRN, Disp: , Rfl:     lisinopril (ZESTRIL) 10 mg tablet, Take 1 tablet (10 mg total) by mouth daily at bedtime (Patient taking differently: Take 20 mg by mouth daily at bedtime ), Disp: 90 tablet, Rfl: 3    LYRICA 75 MG capsule, TAKE 1 CAPSULE BY MOUTH ONCE DAILY, Disp: 90 capsule, Rfl: 0    magnesium 30 MG tablet, Take 30 mg by mouth 2 (two) times a day, Disp: , Rfl:     methimazole (TAPAZOLE) 5 mg tablet, Take 1/2 tab BID by mouth daily, Disp: 30 tablet, Rfl: 0    Multiple Vitamins-Minerals (MULTIVITAMIN ADULT PO), Take 1 tablet po once daily , Disp: , Rfl:     PHARMACIST CHOICE ALCOHOL 70 % PADS, USE 3 TIMES A DAY AFTER CHECKING BLOOD GLUCOSE, Disp: , Rfl: 3    simvastatin (ZOCOR) 40 mg tablet, take 1 tablet (40MG)  by oral route  every day in the evening, Disp: , Rfl:     SITagliptin-MetFORMIN HCl ER (JANUMET XR)  MG TB24, Take 1 tablet by mouth daily  , Disp: , Rfl:     metoprolol succinate (TOPROL-XL) 25 mg 24 hr tablet, Take 1 tablet (25 mg total) by mouth daily, Disp: 90 tablet, Rfl: 3    ALLERGIES:  No Known Allergies    *-*-*-*-*-*-*-*-*-*-*-*-*-*-*-*-*-*-*-*-*-*-*-*-*-*-*-*-*-*-*-*-*-*-*-*-*-*-*-*-*-*-*-*-*-*-*-*-*-*-*-*-*-*-  The LABORATORY DATA:  I have personally reviewed pertinent labs            Potassium   Date Value Ref Range Status   03/11/2019 4 7 3 6 - 5 0 mmol/L Final   01/25/2019 4 2 3 5 - 5 3 mmol/L Final   07/31/2018 4 3 3 5 - 5 3 mmol/L Final   06/14/2018 4 5 3 6 - 5 0 MEQ/L Final   05/07/2018 4 5 3 6 - 5 0 MEQ/L Final     Chloride   Date Value Ref Range Status   03/11/2019 104 97 - 108 mmol/L Final   01/25/2019 106 100 - 108 mmol/L Final   07/31/2018 107 98 - 110 mmol/L Final   06/14/2018 104 97 - 108 MEQ/L Final   05/07/2018 110 (H) 97 - 108 MEQ/L Final     CO2   Date Value Ref Range Status   03/11/2019 23 22 - 30 mmol/L Final   01/25/2019 25 21 - 32 mmol/L Final   07/31/2018 26 20 - 31 mmol/L Final   06/14/2018 27 22 - 30 MMOL/L Final   05/07/2018 23 22 - 30 MMOL/L Final     Anion Gap   Date Value Ref Range Status   06/14/2018 9 5 - 14 MMOL/L Final   05/07/2018 10 5 - 14 MMOL/L Final     BUN   Date Value Ref Range Status   03/11/2019 14 5 - 25 mg/dL Final   01/25/2019 21 5 - 25 mg/dL Final   07/31/2018 15 7 - 25 mg/dL Final   06/14/2018 13 5 - 25 MG/DL Final   05/07/2018 19 5 - 25 MG/DL Final     Creatinine   Date Value Ref Range Status   03/11/2019 0 76 0 60 - 1 20 mg/dL Final     Comment:     Standardized to IDMS reference method   01/25/2019 0 82 0 60 - 1 30 mg/dL Final     Comment:     Standardized to IDMS reference method   07/31/2018 0 89 (H) 0 60 - 0 88 mg/dL Final     Comment:     For patients >52years of age, the reference limit  for Creatinine is approximately 13% higher for people  identified as -American            eGFR   Date Value Ref Range Status   03/11/2019 73 >60 ml/min/1 73sq m Final   01/25/2019 66 ml/min/1 73sq m Final     Glucose   Date Value Ref Range Status   06/14/2018 103 (H) 70 - 99 MG/DL Final   05/07/2018 155 (H) 70 - 99 MG/DL Final     SL AMB CALCIUM   Date Value Ref Range Status   07/31/2018 9 3 8 6 - 10 4 mg/dL Final     Calcium   Date Value Ref Range Status   03/11/2019 9 3 8 4 - 10 2 mg/dL Final   01/25/2019 9 3 8 3 - 10 1 mg/dL Final   06/14/2018 9 4 8 4 - 10 2 MG/DL Final   05/07/2018 9 3 8 4 - 10 2 MG/DL Final     AST   Date Value Ref Range Status   07/31/2018 17 10 - 35 U/L Final   05/07/2018 20 14 - 36 U/L Final     ALT   Date Value Ref Range Status   07/31/2018 9 6 - 29 U/L Final   05/07/2018 23 9 - 52 U/L Final     Alkaline Phosphatase   Date Value Ref Range Status   07/31/2018 60 33 - 130 U/L Final   05/07/2018 65 43 - 122 U/L Final     Total Protein   Date Value Ref Range Status   05/07/2018 6 6 5 9 - 8 4 G/DL Final     Total Bilirubin   Date Value Ref Range Status   05/07/2018 0 2 <1 3 mg/dL Final     WBC   Date Value Ref Range Status   07/22/2019 6 50 4 31 - 10 16 Thousand/uL Final   06/14/2018 5 3 4 5 - 11 0 K/MCL Final     White Blood Cell Count   Date Value Ref Range Status   07/31/2018 5 8 3 8 - 10 8 Thousand/uL Final     Hemoglobin   Date Value Ref Range Status   07/22/2019 10 2 (L) 12 0 - 16 0 g/dL Final   07/31/2018 10 0 (L) 11 7 - 15 5 g/dL Final   06/14/2018 10 2 (L) 12 0 - 16 0 G/DL Final     Platelet Count   Date Value Ref Range Status   07/31/2018 167 140 - 400 Thousand/uL Final     Platelets   Date Value Ref Range Status   07/22/2019 162 150 - 450 Thousands/uL Final   06/14/2018 149 (L) 150 - 450 K/MCL Final     No results found for: PT, PTT, INR  No results found for: CKMB, DIGOXIN  TSH   Date Value Ref Range Status   07/31/2018 1 43 0 40 - 4 50 mIU/L Final     HDL   Date Value Ref Range Status   07/31/2018 63 >50 mg/dL Final     Triglycerides   Date Value Ref Range Status   07/31/2018 139 <150 mg/dL Final      Hemoglobin A1C   Date Value Ref Range Status   07/31/2018 6 6 (H) <5 7 % of total Hgb Final     Comment:     For someone without known diabetes, a hemoglobin A1c  value of 6 5% or greater indicates that they may have   diabetes and this should be confirmed with a follow-up   test      For someone with known diabetes, a value <7% indicates   that their diabetes is well controlled and a value   greater than or equal to 7% indicates suboptimal   control  A1c targets should be individualized based on   duration of diabetes, age, comorbid conditions, and   other considerations  Currently, no consensus exists regarding use of  hemoglobin A1c for diagnosis of diabetes for children  Urine Culture   Date Value Ref Range Status   03/27/2019 >100,000 cfu/ml Escherichia coli (A)  Final   01/25/2019 10,000-19,000 cfu/ml   Final     Comment:     Mixed Contaminants X3       *-*-*-*-*-*-*-*-*-*-*-*-*-*-*-*-*-*-*-*-*-*-*-*-*-*-*-*-*-*-*-*-*-*-*-*-*-*-*-*-*-*-*-*-*-*-*-*-*-*-*-*-*-*-  PREVIOUS CARDIOLOGY & RADIOLOGY RESULTS:  No results found for this or any previous visit  No results found for this or any previous visit  No results found for this or any previous visit  No results found for this or any previous visit  DXA bone density spine hip and pelvis  Narrative: DXA SCAN    CLINICAL HISTORY:  80-year-old woman  Menopause at age 55  OTHER RISK FACTORS:  None  TECHNIQUE: Bone densitometry was performed using a [employer] C bone densitometer  Regions of interest appear properly placed  COMPARISON: 7/21/2016  RESULTS:   LUMBAR SPINE L1-L4 (L2): BMD  1 232  gm/cm2 / T-score 1 6 / Z score 4 5    These values are artifactually elevated due to degenerative sclerosis and osteophytosis  (Lumbar levels within parentheses represent vertebrae excluded from analysis due to local structural abnormalities or artifact)  LEFT  TOTAL HIP: BMD 0 783  gm/cm2 / T-score -1 3 / Z score 0 9  LEFT  FEMORAL NECK: BMD 0 739  gm/cm2 / T score -1 1 / Z score 1 3    LEFT  FOREARM:  33% RADIUS BMD  0 649  gm/cm2 / T-score -0 6 / Z score 3 1    CHANGE: Since the last DXA:  LUMBAR SPINE BMD has INCREASED  0 053 gm/cm2 or 4 5%  HIP BMD has DECREASED  0 013 gm/cm2 or 1 7%  FOREARM BMD has  DECREASED  0 027 gm/cm2 or 4 1%  The significance of these changes is unknown because precision analysis for this unit has not been completed  Impression: 1  Low bone mass (osteopenia)  2   The statistical significance of BMD change since the earlier DXA study from 7/21/2016 can not be determined because precision analysis of this unit has not been completed  3   The 10 year risk of hip fracture is 1 5% with the 10 year risk of major osteoporotic fracture being 6 9% as calculated by the [place] [employer]/WHO fracture risk assessment tool (FRAX)  4   The current NOF guidelines recommend treating patients with a T-score of -2 5 or less in the lumbar spine or hips, or in post-menopausal women and men over the age of 48 with low bone mass (osteopenia) and a FRAX 10 year risk score of >3% for hip   fracture and/or >20% for major osteoporotic fracture  5   The NOF recommends follow-up DXA in 1-2 years after initiating therapy for osteoporosis and every 2 years thereafter  More frequent evaluation is appropriate for patients with conditions associated with rapid bone loss, such as glucocorticoid   therapy  The interval between DXA screenings may be longer for individuals without major risk factors and initial T-score in the normal or upper low bone mass range      The FRAX algorithm has certain limitations:  -FRAX has not been validated in patients currently or previously treated with pharmacotherapy for osteoporosis  In such patients, clinical judgment must be exercised in interpreting FRAX scores  -Prior hip, vertebral and humeral fragility fractures appear to confer greater risk of subsequent fracture than fractures at other sites (this is especially true for individuals with severe vertebral fractures), but quantification of this incremental   risk is not possible with FRAX  -FRAX underestimates fracture risk in patients with history of multiple fragility fractures  -FRAX may underestimate fracture risk in patients with history of frequent falls   -It is not appropriate to use FRAX to monitor treatment response  WHO CLASSIFICATION:  Normal (a T-score of -1 0 or higher)  Low bone mineral density (a T-score of less than -1 0 but higher than -2 5)  Osteoporosis (a T-score of -2 5 or less)  Severe osteoporosis (a T-score of -2 5 or less with a fragility fracture)    Workstation performed: [id number]        *-*-*-*-*-*-*-*-*-*-*-*-*-*-*-*-*-*-*-*-*-*-*-*-*-*-*-*-*-*-*-*-*-*-*-*-*-*-*-*-*-*-*-*-*-*-*-*-*-*-*-*-*-*-  SIGNATURES:   @OIV@   Ana Howard MD     *-*-*-*-*-*-*-*-*-*-*-*-*-*-*-*-*-*-*-*-*-*-*-*-*-*-*-*-*-*-*-*-*-*-*-*-*-*-*-*-*-*-*-*-*-*-*-*-*-*-*-*-*-*-    Social History     Socioeconomic History    Marital status:       Spouse name: Not on file    Number of children: Not on file    Years of education: Not on file    Highest education level: Not on file   Occupational History    Not on file   Social Needs    Financial resource strain: Not on file    Food insecurity:     Worry: Not on file     Inability: Not on file    Transportation needs:     Medical: Not on file     Non-medical: Not on file   Tobacco Use    Smoking status: Never Smoker    Smokeless tobacco: Never Used    Tobacco comment: no passive smoke exposure   Substance and Sexual Activity    Alcohol use: No    Drug use: No    Sexual activity: Never   Lifestyle    Physical activity: Days per week: Not on file     Minutes per session: Not on file    Stress: Not on file   Relationships    Social connections:     Talks on phone: Not on file     Gets together: Not on file     Attends Confucianist service: Not on file     Active member of club or organization: Not on file     Attends meetings of clubs or organizations: Not on file     Relationship status: Not on file    Intimate partner violence:     Fear of current or ex partner: Not on file     Emotionally abused: Not on file     Physically abused: Not on file     Forced sexual activity: Not on file   Other Topics Concern    Not on file   Social History Narrative    Not on file      Family History   Problem Relation Age of Onset    Diabetes Mother     Colon cancer Family      Past Surgical History:   Procedure Laterality Date    APPENDECTOMY      CHOLECYSTECTOMY     [address]

## 2019-09-19 NOTE — ASSESSMENT & PLAN NOTE
She has chest wall pain and some tenderness in the lateral region  No history of trauma  No palpable lesions or visible rash noted in the region  I am advising her to apply some warm compress alternating with cold application to the region  If symptoms persist she is advised to follow up with her primary care physician for further evaluation

## 2019-09-19 NOTE — ASSESSMENT & PLAN NOTE
Blood pressure is normal   However patient is requesting that she would prefer to take a once a day drug rather than twice daily  - we are discontinuing carvedilol   - we are beginning metoprolol succinate 25 mg once daily  - she is advised to continue the lisinopril 10 mg daily   - Dietary and medical compliance are reinforced  - Advised  to report any concerning symptoms such as chest pain, shortness of breath, decline in exercise tolerance or presyncope/syncope

## 2019-09-19 NOTE — ASSESSMENT & PLAN NOTE
Is on simvastatin 40 mg daily  Lipids in July 2019 under control  Normal LFTs  Will continue current therapy

## 2019-09-26 ENCOUNTER — TELEPHONE (OUTPATIENT)
Dept: FAMILY MEDICINE CLINIC | Facility: CLINIC | Age: 84
End: 2019-09-26

## 2019-09-28 ENCOUNTER — APPOINTMENT (OUTPATIENT)
Dept: LAB | Facility: HOSPITAL | Age: 84
End: 2019-09-28
Payer: COMMERCIAL

## 2019-09-28 ENCOUNTER — TRANSCRIBE ORDERS (OUTPATIENT)
Dept: ADMINISTRATIVE | Facility: HOSPITAL | Age: 84
End: 2019-09-28

## 2019-09-28 DIAGNOSIS — Z79.899 ENCOUNTER FOR LONG-TERM (CURRENT) USE OF OTHER MEDICATIONS: ICD-10-CM

## 2019-09-28 DIAGNOSIS — E55.9 AVITAMINOSIS D: ICD-10-CM

## 2019-09-28 DIAGNOSIS — E11.22 TYPE 2 DIABETES MELLITUS WITH ESRD (END-STAGE RENAL DISEASE) (HCC): ICD-10-CM

## 2019-09-28 DIAGNOSIS — N18.6 TYPE 2 DIABETES MELLITUS WITH ESRD (END-STAGE RENAL DISEASE) (HCC): ICD-10-CM

## 2019-09-28 DIAGNOSIS — E11.00 TYPE II DIABETES MELLITUS WITH HYPEROSMOLARITY, UNCONTROLLED (HCC): ICD-10-CM

## 2019-09-28 DIAGNOSIS — I10 HYPERTENSION, ESSENTIAL: ICD-10-CM

## 2019-09-28 DIAGNOSIS — E78.00 PURE HYPERCHOLESTEROLEMIA: ICD-10-CM

## 2019-09-28 DIAGNOSIS — E05.00 BASEDOW'S DISEASE: ICD-10-CM

## 2019-09-28 DIAGNOSIS — E11.65 TYPE II DIABETES MELLITUS WITH HYPEROSMOLARITY, UNCONTROLLED (HCC): Primary | ICD-10-CM

## 2019-09-28 DIAGNOSIS — E11.00 TYPE II DIABETES MELLITUS WITH HYPEROSMOLARITY, UNCONTROLLED (HCC): Primary | ICD-10-CM

## 2019-09-28 DIAGNOSIS — E11.65 TYPE II DIABETES MELLITUS WITH HYPEROSMOLARITY, UNCONTROLLED (HCC): ICD-10-CM

## 2019-09-28 LAB
ALBUMIN SERPL BCP-MCNC: 4.1 G/DL (ref 3–5.2)
ALP SERPL-CCNC: 61 U/L (ref 43–122)
ALT SERPL W P-5'-P-CCNC: 19 U/L (ref 9–52)
ANION GAP SERPL CALCULATED.3IONS-SCNC: 7 MMOL/L (ref 5–14)
ANISOCYTOSIS BLD QL SMEAR: PRESENT
AST SERPL W P-5'-P-CCNC: 19 U/L (ref 14–36)
BASOPHILS # BLD AUTO: 0 THOUSANDS/ΜL (ref 0–0.1)
BASOPHILS NFR BLD AUTO: 1 % (ref 0–1)
BILIRUB SERPL-MCNC: 0.3 MG/DL
BUN SERPL-MCNC: 21 MG/DL (ref 5–25)
CALCIUM SERPL-MCNC: 9.5 MG/DL (ref 8.4–10.2)
CHLORIDE SERPL-SCNC: 112 MMOL/L (ref 97–108)
CHOLEST SERPL-MCNC: 149 MG/DL
CO2 SERPL-SCNC: 25 MMOL/L (ref 22–30)
CREAT SERPL-MCNC: 0.99 MG/DL (ref 0.6–1.2)
EOSINOPHIL # BLD AUTO: 0.2 THOUSAND/ΜL (ref 0–0.4)
EOSINOPHIL NFR BLD AUTO: 3 % (ref 0–6)
ERYTHROCYTE [DISTWIDTH] IN BLOOD BY AUTOMATED COUNT: 14.7 %
EST. AVERAGE GLUCOSE BLD GHB EST-MCNC: 140 MG/DL
GFR SERPL CREATININE-BSD FRML MDRD: 53 ML/MIN/1.73SQ M
GLUCOSE P FAST SERPL-MCNC: 105 MG/DL (ref 70–99)
HBA1C MFR BLD: 6.5 % (ref 4.2–6.3)
HCT VFR BLD AUTO: 31.3 % (ref 36–46)
HDLC SERPL-MCNC: 58 MG/DL (ref 40–59)
HGB BLD-MCNC: 9.7 G/DL (ref 12–16)
HYPERCHROMIA BLD QL SMEAR: PRESENT
LDLC SERPL CALC-MCNC: 73 MG/DL
LG PLATELETS BLD QL SMEAR: PRESENT
LYMPHOCYTES # BLD AUTO: 1.9 THOUSANDS/ΜL (ref 0.5–4)
LYMPHOCYTES NFR BLD AUTO: 29 % (ref 25–45)
MCH RBC QN AUTO: 22.8 PG (ref 26.8–34.3)
MCHC RBC AUTO-ENTMCNC: 31.1 G/DL (ref 31.4–37.4)
MCV RBC AUTO: 74 FL (ref 80–100)
MICROCYTES BLD QL AUTO: PRESENT
MONOCYTES # BLD AUTO: 0.5 THOUSAND/ΜL (ref 0.2–0.9)
MONOCYTES NFR BLD AUTO: 7 % (ref 1–10)
NEUTROPHILS # BLD AUTO: 4 THOUSANDS/ΜL (ref 1.8–7.8)
NEUTS SEG NFR BLD AUTO: 61 % (ref 45–65)
NONHDLC SERPL-MCNC: 91 MG/DL
OVALOCYTES BLD QL SMEAR: PRESENT
PLATELET # BLD AUTO: 158 THOUSANDS/UL (ref 150–450)
PLATELET BLD QL SMEAR: ADEQUATE
PMV BLD AUTO: 10.4 FL (ref 8.9–12.7)
POTASSIUM SERPL-SCNC: 4.5 MMOL/L (ref 3.6–5)
PROT SERPL-MCNC: 7.1 G/DL (ref 5.9–8.4)
RBC # BLD AUTO: 4.26 MILLION/UL (ref 4–5.2)
RBC MORPH BLD: NORMAL
SODIUM SERPL-SCNC: 144 MMOL/L (ref 137–147)
T4 FREE SERPL-MCNC: 1.07 NG/DL (ref 0.76–1.46)
TRIGL SERPL-MCNC: 91 MG/DL
TSH SERPL DL<=0.05 MIU/L-ACNC: 1.22 UIU/ML (ref 0.47–4.68)
WBC # BLD AUTO: 6.6 THOUSAND/UL (ref 4.31–10.16)

## 2019-09-28 PROCEDURE — 82306 VITAMIN D 25 HYDROXY: CPT

## 2019-09-28 PROCEDURE — 83036 HEMOGLOBIN GLYCOSYLATED A1C: CPT | Performed by: INTERNAL MEDICINE

## 2019-09-28 PROCEDURE — 84439 ASSAY OF FREE THYROXINE: CPT

## 2019-09-28 PROCEDURE — 84443 ASSAY THYROID STIM HORMONE: CPT

## 2019-09-28 PROCEDURE — 80053 COMPREHEN METABOLIC PANEL: CPT

## 2019-09-28 PROCEDURE — 80061 LIPID PANEL: CPT

## 2019-09-28 PROCEDURE — 85025 COMPLETE CBC W/AUTO DIFF WBC: CPT

## 2019-09-28 PROCEDURE — 36415 COLL VENOUS BLD VENIPUNCTURE: CPT | Performed by: INTERNAL MEDICINE

## 2019-09-29 LAB — 25(OH)D3 SERPL-MCNC: 78.6 NG/ML (ref 30–100)

## 2019-10-03 ENCOUNTER — TELEPHONE (OUTPATIENT)
Dept: FAMILY MEDICINE CLINIC | Facility: CLINIC | Age: 84
End: 2019-10-03

## 2019-10-03 NOTE — TELEPHONE ENCOUNTER
Patient called stating [facility] called her stating they would arrive to pick her up today at 08:00-08:30am  I called patient to inform her on 09/26/19 that [facility] Is no longer effective as of 10/01  She states she recalls this conversation but when she went to the hospital to complete labs they stated they only cancelled [facility] for Labs and Imaging  Patient cancelled appointment and would not like to reschedule at this time due to transportation

## 2019-10-07 ENCOUNTER — TELEPHONE (OUTPATIENT)
Dept: FAMILY MEDICINE CLINIC | Facility: CLINIC | Age: 84
End: 2019-10-07

## 2019-10-07 NOTE — TELEPHONE ENCOUNTER
Patient is unsure if she will have transportation to continue coming to the practice she will keep us updated  She is aware of her results

## 2019-10-07 NOTE — TELEPHONE ENCOUNTER
----- Message from Nando Salas MD sent at 10/5/2019  6:38 PM EDT -----  Patient did not keep her appointment  Please tell her, her bone density showed osteopenia    Recommend to start Fosamax

## 2019-10-23 DIAGNOSIS — M54.50 CHRONIC MIDLINE LOW BACK PAIN: ICD-10-CM

## 2019-10-23 DIAGNOSIS — G89.29 CHRONIC MIDLINE LOW BACK PAIN: ICD-10-CM

## 2019-10-23 RX ORDER — PREGABALIN 75 MG/1
CAPSULE ORAL
Qty: 90 CAPSULE | Refills: 0 | Status: SHIPPED | OUTPATIENT
Start: 2019-10-23

## 2019-12-02 DIAGNOSIS — M54.50 CHRONIC MIDLINE LOW BACK PAIN: ICD-10-CM

## 2019-12-02 DIAGNOSIS — G89.29 CHRONIC MIDLINE LOW BACK PAIN: ICD-10-CM

## 2019-12-02 DIAGNOSIS — I10 ESSENTIAL HYPERTENSION: ICD-10-CM

## 2019-12-02 RX ORDER — PREGABALIN 75 MG/1
CAPSULE ORAL
Qty: 90 CAPSULE | Refills: 1 | OUTPATIENT
Start: 2019-12-02

## 2019-12-02 RX ORDER — CARVEDILOL 12.5 MG/1
TABLET ORAL
Qty: 180 TABLET | Refills: 1 | OUTPATIENT
Start: 2019-12-02

## 2019-12-02 NOTE — TELEPHONE ENCOUNTER
Patient saw an [address] [facility] physician to establish care

## 2019-12-22 DIAGNOSIS — M54.50 CHRONIC MIDLINE LOW BACK PAIN: ICD-10-CM

## 2019-12-22 DIAGNOSIS — G89.29 CHRONIC MIDLINE LOW BACK PAIN: ICD-10-CM

## 2019-12-23 RX ORDER — PREGABALIN 75 MG/1
CAPSULE ORAL
Qty: 90 CAPSULE | Refills: 0 | OUTPATIENT
Start: 2019-12-23

## 2019-12-23 NOTE — TELEPHONE ENCOUNTER
Was seen by a different provider in October and does not have an appointment scheduled at this office

## 2020-02-07 DIAGNOSIS — G89.29 CHRONIC MIDLINE LOW BACK PAIN: ICD-10-CM

## 2020-02-07 DIAGNOSIS — M54.50 CHRONIC MIDLINE LOW BACK PAIN: ICD-10-CM

## 2020-02-07 RX ORDER — PREGABALIN 75 MG/1
CAPSULE ORAL
Qty: 90 CAPSULE | OUTPATIENT
Start: 2020-02-07

## 2020-02-15 DIAGNOSIS — G89.29 CHRONIC MIDLINE LOW BACK PAIN: ICD-10-CM

## 2020-02-15 DIAGNOSIS — M54.50 CHRONIC MIDLINE LOW BACK PAIN: ICD-10-CM

## 2020-02-17 RX ORDER — PREGABALIN 75 MG/1
CAPSULE ORAL
Qty: 90 CAPSULE | OUTPATIENT
Start: 2020-02-17

## 2020-02-22 DIAGNOSIS — M54.50 CHRONIC MIDLINE LOW BACK PAIN: ICD-10-CM

## 2020-02-22 DIAGNOSIS — G89.29 CHRONIC MIDLINE LOW BACK PAIN: ICD-10-CM

## 2020-02-24 RX ORDER — PREGABALIN 75 MG/1
CAPSULE ORAL
Qty: 90 CAPSULE | OUTPATIENT
Start: 2020-02-24

## 2020-03-14 DIAGNOSIS — G89.29 CHRONIC MIDLINE LOW BACK PAIN: ICD-10-CM

## 2020-03-14 DIAGNOSIS — I10 ESSENTIAL HYPERTENSION: ICD-10-CM

## 2020-03-14 DIAGNOSIS — M54.50 CHRONIC MIDLINE LOW BACK PAIN: ICD-10-CM

## 2020-03-15 RX ORDER — LISINOPRIL 10 MG/1
TABLET ORAL
Qty: 90 TABLET | Refills: 3 | Status: SHIPPED | OUTPATIENT
Start: 2020-03-15

## 2020-03-21 DIAGNOSIS — M54.50 CHRONIC MIDLINE LOW BACK PAIN: ICD-10-CM

## 2020-03-21 DIAGNOSIS — G89.29 CHRONIC MIDLINE LOW BACK PAIN: ICD-10-CM

## 2020-03-23 RX ORDER — PREGABALIN 75 MG/1
CAPSULE ORAL
Qty: 90 CAPSULE | OUTPATIENT
Start: 2020-03-23

## 2020-04-28 ENCOUNTER — TELEPHONE (OUTPATIENT)
Dept: CARDIOLOGY CLINIC | Facility: CLINIC | Age: 85
End: 2020-04-28

## 2020-05-15 ENCOUNTER — TELEMEDICINE (OUTPATIENT)
Dept: CARDIOLOGY CLINIC | Facility: CLINIC | Age: 85
End: 2020-05-15
Payer: COMMERCIAL

## 2020-05-15 VITALS — BODY MASS INDEX: 28.78 KG/M2 | HEIGHT: 57 IN

## 2020-05-15 DIAGNOSIS — E78.2 MIXED HYPERLIPIDEMIA: ICD-10-CM

## 2020-05-15 DIAGNOSIS — I10 ESSENTIAL HYPERTENSION: Primary | ICD-10-CM

## 2020-05-15 PROCEDURE — 99213 OFFICE O/P EST LOW 20 MIN: CPT | Performed by: INTERNAL MEDICINE

## 2020-05-15 PROCEDURE — 1160F RVW MEDS BY RX/DR IN RCRD: CPT | Performed by: INTERNAL MEDICINE

## 2020-06-19 DIAGNOSIS — G89.29 CHRONIC MIDLINE LOW BACK PAIN: ICD-10-CM

## 2020-06-19 DIAGNOSIS — M54.50 CHRONIC MIDLINE LOW BACK PAIN: ICD-10-CM

## 2020-06-22 RX ORDER — PREGABALIN 75 MG/1
CAPSULE ORAL
Qty: 90 CAPSULE | OUTPATIENT
Start: 2020-06-22

## 2020-09-17 DIAGNOSIS — M54.50 CHRONIC MIDLINE LOW BACK PAIN: ICD-10-CM

## 2020-09-17 DIAGNOSIS — G89.29 CHRONIC MIDLINE LOW BACK PAIN: ICD-10-CM

## 2020-09-17 RX ORDER — PREGABALIN 75 MG/1
CAPSULE ORAL
Qty: 90 CAPSULE | OUTPATIENT
Start: 2020-09-17

## 2020-10-10 ENCOUNTER — LAB (OUTPATIENT)
Dept: LAB | Facility: HOSPITAL | Age: 85
End: 2020-10-10
Payer: COMMERCIAL

## 2020-10-10 ENCOUNTER — TRANSCRIBE ORDERS (OUTPATIENT)
Dept: ADMINISTRATIVE | Facility: HOSPITAL | Age: 85
End: 2020-10-10

## 2020-10-10 DIAGNOSIS — E11.69 TYPE 2 DIABETES MELLITUS WITH OTHER SPECIFIED COMPLICATION, WITHOUT LONG-TERM CURRENT USE OF INSULIN (HCC): ICD-10-CM

## 2020-10-10 DIAGNOSIS — E05.90 HYPERTHYROIDISM: ICD-10-CM

## 2020-10-10 DIAGNOSIS — E78.5 DYSLIPIDEMIA: ICD-10-CM

## 2020-10-10 DIAGNOSIS — E11.69 TYPE 2 DIABETES MELLITUS WITH OTHER SPECIFIED COMPLICATION, WITHOUT LONG-TERM CURRENT USE OF INSULIN (HCC): Primary | ICD-10-CM

## 2020-10-10 LAB
ALBUMIN SERPL BCP-MCNC: 3.6 G/DL (ref 3.5–5)
ALP SERPL-CCNC: 75 U/L (ref 46–116)
ALT SERPL W P-5'-P-CCNC: 17 U/L (ref 12–78)
ANION GAP SERPL CALCULATED.3IONS-SCNC: 9 MMOL/L (ref 4–13)
AST SERPL W P-5'-P-CCNC: 16 U/L (ref 5–45)
BASOPHILS # BLD AUTO: 0.04 THOUSANDS/ΜL (ref 0–0.1)
BASOPHILS NFR BLD AUTO: 1 % (ref 0–1)
BILIRUB SERPL-MCNC: 0.28 MG/DL (ref 0.2–1)
BUN SERPL-MCNC: 17 MG/DL (ref 5–25)
CALCIUM SERPL-MCNC: 9.1 MG/DL (ref 8.3–10.1)
CHLORIDE SERPL-SCNC: 104 MMOL/L (ref 100–108)
CHOLEST SERPL-MCNC: 166 MG/DL (ref 50–200)
CO2 SERPL-SCNC: 26 MMOL/L (ref 21–32)
CREAT SERPL-MCNC: 0.98 MG/DL (ref 0.6–1.3)
EOSINOPHIL # BLD AUTO: 0.13 THOUSAND/ΜL (ref 0–0.61)
EOSINOPHIL NFR BLD AUTO: 2 % (ref 0–6)
ERYTHROCYTE [DISTWIDTH] IN BLOOD BY AUTOMATED COUNT: 13.7 % (ref 11.6–15.1)
EST. AVERAGE GLUCOSE BLD GHB EST-MCNC: 143 MG/DL
GFR SERPL CREATININE-BSD FRML MDRD: 53 ML/MIN/1.73SQ M
GLUCOSE P FAST SERPL-MCNC: 114 MG/DL (ref 65–99)
HBA1C MFR BLD: 6.6 %
HCT VFR BLD AUTO: 37.5 % (ref 34.8–46.1)
HDLC SERPL-MCNC: 76 MG/DL
HGB BLD-MCNC: 10.9 G/DL (ref 11.5–15.4)
IMM GRANULOCYTES # BLD AUTO: 0.02 THOUSAND/UL (ref 0–0.2)
IMM GRANULOCYTES NFR BLD AUTO: 0 % (ref 0–2)
LDLC SERPL CALC-MCNC: 72 MG/DL (ref 0–100)
LYMPHOCYTES # BLD AUTO: 2.02 THOUSANDS/ΜL (ref 0.6–4.47)
LYMPHOCYTES NFR BLD AUTO: 29 % (ref 14–44)
MCH RBC QN AUTO: 22.5 PG (ref 26.8–34.3)
MCHC RBC AUTO-ENTMCNC: 29.1 G/DL (ref 31.4–37.4)
MCV RBC AUTO: 78 FL (ref 82–98)
MONOCYTES # BLD AUTO: 0.61 THOUSAND/ΜL (ref 0.17–1.22)
MONOCYTES NFR BLD AUTO: 9 % (ref 4–12)
NEUTROPHILS # BLD AUTO: 4.11 THOUSANDS/ΜL (ref 1.85–7.62)
NEUTS SEG NFR BLD AUTO: 59 % (ref 43–75)
NONHDLC SERPL-MCNC: 90 MG/DL
NRBC BLD AUTO-RTO: 0 /100 WBCS
PLATELET # BLD AUTO: 237 THOUSANDS/UL (ref 149–390)
PMV BLD AUTO: 12.4 FL (ref 8.9–12.7)
POTASSIUM SERPL-SCNC: 4.4 MMOL/L (ref 3.5–5.3)
PROT SERPL-MCNC: 7.4 G/DL (ref 6.4–8.2)
RBC # BLD AUTO: 4.84 MILLION/UL (ref 3.81–5.12)
SODIUM SERPL-SCNC: 139 MMOL/L (ref 136–145)
T4 FREE SERPL-MCNC: 1.26 NG/DL (ref 0.76–1.46)
TRIGL SERPL-MCNC: 92 MG/DL
TSH SERPL DL<=0.05 MIU/L-ACNC: 0.88 UIU/ML (ref 0.36–3.74)
WBC # BLD AUTO: 6.93 THOUSAND/UL (ref 4.31–10.16)

## 2020-10-10 PROCEDURE — 83036 HEMOGLOBIN GLYCOSYLATED A1C: CPT

## 2020-10-10 PROCEDURE — 84443 ASSAY THYROID STIM HORMONE: CPT

## 2020-10-10 PROCEDURE — 36415 COLL VENOUS BLD VENIPUNCTURE: CPT

## 2020-10-10 PROCEDURE — 80053 COMPREHEN METABOLIC PANEL: CPT

## 2020-10-10 PROCEDURE — 85025 COMPLETE CBC W/AUTO DIFF WBC: CPT

## 2020-10-10 PROCEDURE — 80061 LIPID PANEL: CPT

## 2020-10-10 PROCEDURE — 84439 ASSAY OF FREE THYROXINE: CPT

## 2021-01-25 ENCOUNTER — IMMUNIZATIONS (OUTPATIENT)
Dept: FAMILY MEDICINE CLINIC | Facility: HOSPITAL | Age: 86
End: 2021-01-25

## 2021-01-25 DIAGNOSIS — Z23 ENCOUNTER FOR IMMUNIZATION: Primary | ICD-10-CM

## 2021-01-25 PROCEDURE — 91301 SARS-COV-2 / COVID-19 MRNA VACCINE (MODERNA) 100 MCG: CPT

## 2021-01-25 PROCEDURE — 0011A SARS-COV-2 / COVID-19 MRNA VACCINE (MODERNA) 100 MCG: CPT

## 2021-02-22 ENCOUNTER — IMMUNIZATIONS (OUTPATIENT)
Dept: FAMILY MEDICINE CLINIC | Facility: HOSPITAL | Age: 86
End: 2021-02-22

## 2021-02-22 DIAGNOSIS — Z23 ENCOUNTER FOR IMMUNIZATION: Primary | ICD-10-CM

## 2021-02-22 PROCEDURE — 0012A SARS-COV-2 / COVID-19 MRNA VACCINE (MODERNA) 100 MCG: CPT

## 2021-02-22 PROCEDURE — 91301 SARS-COV-2 / COVID-19 MRNA VACCINE (MODERNA) 100 MCG: CPT

## 2021-03-18 ENCOUNTER — OFFICE VISIT (OUTPATIENT)
Dept: CARDIOLOGY CLINIC | Facility: CLINIC | Age: 86
End: 2021-03-18
Payer: COMMERCIAL

## 2021-03-18 VITALS
HEART RATE: 72 BPM | BODY MASS INDEX: 27.18 KG/M2 | DIASTOLIC BLOOD PRESSURE: 62 MMHG | HEIGHT: 57 IN | SYSTOLIC BLOOD PRESSURE: 120 MMHG | WEIGHT: 126 LBS

## 2021-03-18 DIAGNOSIS — I10 ESSENTIAL HYPERTENSION: ICD-10-CM

## 2021-03-18 DIAGNOSIS — I51.7 CARDIOMEGALY: Primary | ICD-10-CM

## 2021-03-18 DIAGNOSIS — I49.1 ISOLATED PREMATURE ATRIAL CONTRACTIONS: ICD-10-CM

## 2021-03-18 DIAGNOSIS — I65.23 BILATERAL CAROTID ARTERY STENOSIS: ICD-10-CM

## 2021-03-18 PROCEDURE — 93000 ELECTROCARDIOGRAM COMPLETE: CPT | Performed by: INTERNAL MEDICINE

## 2021-03-18 PROCEDURE — 1036F TOBACCO NON-USER: CPT | Performed by: INTERNAL MEDICINE

## 2021-03-18 PROCEDURE — 1160F RVW MEDS BY RX/DR IN RCRD: CPT | Performed by: INTERNAL MEDICINE

## 2021-03-18 PROCEDURE — 99214 OFFICE O/P EST MOD 30 MIN: CPT | Performed by: INTERNAL MEDICINE

## 2021-03-18 PROCEDURE — 3078F DIAST BP <80 MM HG: CPT | Performed by: INTERNAL MEDICINE

## 2021-03-18 PROCEDURE — 3074F SYST BP LT 130 MM HG: CPT | Performed by: INTERNAL MEDICINE

## 2021-03-18 RX ORDER — ASPIRIN 81 MG
100 TABLET, DELAYED RELEASE (ENTERIC COATED) ORAL DAILY
COMMUNITY

## 2021-03-18 RX ORDER — AMMONIUM LACTATE 12 G/100G
CREAM TOPICAL
COMMUNITY
Start: 2020-09-21 | End: 2021-09-24

## 2021-03-18 RX ORDER — IBUPROFEN 200 MG
400 TABLET ORAL EVERY 8 HOURS PRN
COMMUNITY

## 2021-03-18 RX ORDER — CLOTRIMAZOLE AND BETAMETHASONE DIPROPIONATE 10; .64 MG/G; MG/G
CREAM TOPICAL 2 TIMES DAILY
COMMUNITY
Start: 2020-09-21 | End: 2021-09-21

## 2021-03-18 NOTE — PATIENT INSTRUCTIONS
CARDIOLOGY ASSESSMENT & PLAN:  1  Cardiomegaly  POCT ECG   2  Bilateral carotid artery stenosis  POCT ECG   3  Essential hypertension  POCT ECG   4  Isolated premature atrial contractions       Essential hypertension  Ms Sandrita Cortes is overall doing well from cardiac perspective with some atypical chest pain that suggests musculoskeletal pain  She is noted to have frequent premature beats  ECG shows these are probably sinus beats or premature atrial contractions  Symptoms are not sustained  Her blood pressure is well controlled  Exam shows no signs of significant valvular heart disease or heart failure  -- at this time we are advising her to continue current medical therapy  -- will consider Holter monitor if she has any palpitations  -- regarding her atypical chest pain am advising her to take Tylenol to see if it will help with the symptom  However if symptoms are recurring and especially occurring in association with physical activity or has associated symptoms of shortness of breath and diaphoresis that she should call us so that we can consider doing a stress test   -- I would like to see her back in 6 months , earlier if necessary

## 2021-03-18 NOTE — ASSESSMENT & PLAN NOTE
Ms Veto Velasco is overall doing well from cardiac perspective with some atypical chest pain that suggests musculoskeletal pain  She is noted to have frequent premature beats  ECG shows these are probably sinus beats or premature atrial contractions  Symptoms are not sustained  Her blood pressure is well controlled  Exam shows no signs of significant valvular heart disease or heart failure  -- at this time we are advising her to continue current medical therapy  -- will consider Holter monitor if she has any palpitations  -- regarding her atypical chest pain am advising her to take Tylenol to see if it will help with the symptom  However if symptoms are recurring and especially occurring in association with physical activity or has associated symptoms of shortness of breath and diaphoresis that she should call us so that we can consider doing a stress test   -- I would like to see her back in 6 months , earlier if necessary

## 2021-03-18 NOTE — PROGRESS NOTES
[facility]  [address] [facility], [place] [address]  Phone#  [phone]  Fax#  [phone]  *-*-*-*-*-*-*-*-*-*-*-*-*-*-*-*-*-*-*-*-*-*-*-*-*-*-*-*-*-*-*-*-*-*-*-*-*-*-*-*-*-*-*-*-*-*-*-*-*-*-*-*-*-*  ENCOUNTER DATE: 03/18/21 1:37 PM  PATIENT NAME: Cb Jensen   1935    [id number]  Age: 80 y o  Sex: female  AUTHOR: Ana Howard MD  [facility] PHYSICIAN: Rowdy Fuentes DO    DIAGNOSES:  1  Benign essential hypertension  2  Mixed dyslipidemia  3  History of chest pain  4  History of diabetes mellitus  5  Hypo and hyperthyroidism  6  Anemia  7  Degenerative joint disease  8  History of diverticulosis  9  Cervical radiculopathy  10 History of carotid artery disease  11  History of cholecystectomy and appendectomy  12  Vitamin-D deficiency  13  History of umbilical hernia    Dobutamine stress echocardiogram at [facility] in March 2017 was negative for ischemia  EF was reported as 60%  No significant valvular abnormality  CURRENT ECG:  Results for orders placed or performed in visit on 03/18/21   POCT ECG    Narrative    Sinus rhythm with marked sinus arrhythmia  HR 72 beats per minute, normal axis and intervals, possible prior anterior infarction, borderline low QRS voltages  CARDIOLOGY ASSESSMENT & PLAN:  1  Cardiomegaly  POCT ECG   2  Bilateral carotid artery stenosis  POCT ECG   3  Essential hypertension  POCT ECG   4  Isolated premature atrial contractions       Essential hypertension  Ms Cb Jensen is overall doing well from cardiac perspective with some atypical chest pain that suggests musculoskeletal pain  She is noted to have frequent premature beats  ECG shows these are probably sinus beats or premature atrial contractions  Symptoms are not sustained  Her blood pressure is well controlled  Exam shows no signs of significant valvular heart disease or heart failure    -- at this time we are advising her to continue current medical therapy  -- will consider Holter monitor if she has any palpitations  -- regarding her atypical chest pain am advising her to take Tylenol to see if it will help with the symptom  However if symptoms are recurring and especially occurring in association with physical activity or has associated symptoms of shortness of breath and diaphoresis that she should call us so that we can consider doing a stress test   -- I would like to see her back in 6 months , earlier if necessary  INTERVAL HISTORY & HISTORY OF PRESENT ILLNESS:  Sergio Xavier is here for follow-up regarding her cardiac comorbidities which include:  Essential hypertension, dyslipidemia, history of chest pain, carotid artery disease and other comorbidities  Patient is here for follow-up  As she primarily speaks [employer] her niece is providing translation during this visit  He has been overall all right  She does mention that for last few weeks occasionally she is experiencing some tingling and pain on the left side of her chest wall  Things often begin in back and lateral chest wall and radiate to the front  Symptom occurs randomly is not associated with nausea vomiting or diaphoresis and is resolved by itself after about 10 minutes  Occasionally she has lightheadedness  Denies any presyncope/syncope  Denies any orthopnea, PND or worsening pedal edema  Functional capacity status:  Good for her age   (Excellent- >10 METs; Good: (7-10 METs); Moderate (4-7 METs); Poor (<= 4 METs)    Any chronic stressors:  None   (feeling of poor health, financial problems, and social isolation etc)  Tobacco or alcohol dependence:  None    REVIEW OF SYMPTOMS:    Positive for:  As described in HPI  Negative for: All remaining as reviewed below and in HPI      SYSTEM SYMPTOMS REVIEWED:  General--weight change, fever, night sweats  Respiratory--cough, wheezing, shortness of breath, sputum production  Cardiovascular--chest pain, syncope, dyspnea on exertion, edema, decline in exercise tolerance, claudication   Gastrointestinal--persistent vomiting, diarrhea, abdominal distention, blood in stool   Muscular or skeletal--joint pain or swelling   Neurologic--headaches, syncope, abnormal movement  Hematologic--history of easy bruising and bleeding   Endocrine--thyroid enlargement, heat or cold intolerance, polyuria   Psychiatric--anxiety, depression     *-*-*-*-*-*-*-*-*-*-*-*-*-*-*-*-*-*-*-*-*-*-*-*-*-*-*-*-*-*-*-*-*-*-*-*-*-*-*-*-*-*-*-*-*-*-*-*-*-*-*-*-*-*-  VITAL SIGNS:  Vitals:    03/18/21 1306   BP: 120/62   Pulse: 72   Weight: 57 2 kg (126 lb)   Height: 4' 9" (1 448 m)     Weight (last 2 days)     Date/Time   Weight    03/18/21 1306   57 2 (126)           ,   Wt Readings from Last 3 Encounters:   03/18/21 57 2 kg (126 lb)   09/19/19 60 3 kg (133 lb)   06/27/19 61 2 kg (135 lb)    , Body mass index is 27 27 kg/m²  *-*-*-*-*-*-*-*-*-*-*-*-*-*-*-*-*-*-*-*-*-*-*-*-*-*-*-*-*-*-*-*-*-*-*-*-*-*-*-*-*-*-*-*-*-*-*-*-*-*-*-*-*-*-  PHYSICAL EXAM:  General Appearance:    Alert, cooperative, no distress, appears stated age, short stature  Head, Eyes, ENT:    No obvious abnormality, moist mucous mebranes  Neck:   Supple, no carotid bruit or JVD   Back:     Symmetric, no curvature  Lungs:     Respirations unlabored  Clear to auscultation bilaterally,    Chest wall:    No tenderness or deformity   Heart:    Regular rhythm , S1 and S2 normal, no murmur, rub  or gallop     Abdomen:     Soft, non-tender, No obvious masses, or organomegaly   Extremities:   Extremities warm, no cyanosis or edema    Skin:   Skin color, texture, turgor normal, no rashes or lesions     *-*-*-*-*-*-*-*-*-*-*-*-*-*-*-*-*-*-*-*-*-*-*-*-*-*-*-*-*-*-*-*-*-*-*-*-*-*-*-*-*-*-*-*-*-*-*-*-*-*-*-*-*-*-  CURRENT MEDICATION LIST:    Current Outpatient Medications:     ammonium lactate (LAC-HYDRIN) 12 % cream, Apply topically, Disp: , Rfl:     aspirin 81 MG tablet, Take 81 mg by mouth daily  , Disp: , Rfl:     clotrimazole-betamethasone (LOTRISONE) 1-0 05 % cream, Apply topically 2 (two) times a day, Disp: , Rfl:     Docusate Sodium 100 MG capsule, Take 100 mg by mouth daily, Disp: , Rfl:     furosemide (LASIX) 20 mg tablet, take 1 tablet by mouth once daily PRN, Disp: , Rfl:     ibuprofen (MOTRIN) 200 mg tablet, Take 400 mg by mouth every 8 (eight) hours as needed, Disp: , Rfl:     linaCLOtide 72 MCG CAPS, Take by mouth, Disp: , Rfl:     lisinopril (ZESTRIL) 10 mg tablet, take 1 tablet by mouth once daily at bedtime (Patient taking differently: 20 mg ), Disp: 90 tablet, Rfl: 3    magnesium 30 MG tablet, Take 30 mg by mouth 2 (two) times a day, Disp: , Rfl:     methimazole (TAPAZOLE) 5 mg tablet, Take 1/2 tab BID by mouth daily, Disp: 30 tablet, Rfl: 0    metoprolol succinate (TOPROL-XL) 25 mg 24 hr tablet, Take 1 tablet (25 mg total) by mouth daily, Disp: 90 tablet, Rfl: 3    Multiple Vitamins-Minerals (MULTIVITAMIN ADULT PO), Take 1 tablet po once daily , Disp: , Rfl:     PHARMACIST CHOICE ALCOHOL 70 % PADS, USE 3 TIMES A DAY AFTER CHECKING BLOOD GLUCOSE, Disp: , Rfl: 3    pregabalin (LYRICA) 75 mg capsule, take 1 capsule by mouth daily, Disp: 90 capsule, Rfl: 0    simvastatin (ZOCOR) 40 mg tablet, take 1 tablet (40MG)  by oral route  every day in the evening, Disp: , Rfl:     SITagliptin-MetFORMIN HCl ER (JANUMET XR)  MG TB24, Take 1 tablet by mouth daily  , Disp: , Rfl:     VITAMIN D PO, Take 50,000 mg by mouth, Disp: , Rfl:     Ascorbic Acid (VITAMIN C PO), Take 150 mg by mouth, Disp: , Rfl:     ALLERGIES:  No Known Allergies    *-*-*-*-*-*-*-*-*-*-*-*-*-*-*-*-*-*-*-*-*-*-*-*-*-*-*-*-*-*-*-*-*-*-*-*-*-*-*-*-*-*-*-*-*-*-*-*-*-*-*-*-*-*-  The LABORATORY DATA:  I have personally reviewed pertinent labs      Sodium   Date Value Ref Range Status   06/14/2018 140 137 - 147 MEQ/L Final   05/07/2018 143 137 - 147 MEQ/L Final     Potassium   Date Value Ref Range Status   10/10/2020 4 4 3 5 - 5 3 mmol/L Final   09/28/2019 4 5 3 6 - 5 0 mmol/L Final   03/11/2019 4 7 3 6 - 5 0 mmol/L Final   07/31/2018 4 3 3 5 - 5 3 mmol/L Final   06/14/2018 4 5 3 6 - 5 0 MEQ/L Final   05/07/2018 4 5 3 6 - 5 0 MEQ/L Final     Chloride   Date Value Ref Range Status   10/10/2020 104 100 - 108 mmol/L Final   09/28/2019 112 (H) 97 - 108 mmol/L Final   03/11/2019 104 97 - 108 mmol/L Final   07/31/2018 107 98 - 110 mmol/L Final   06/14/2018 104 97 - 108 MEQ/L Final   05/07/2018 110 (H) 97 - 108 MEQ/L Final     CO2   Date Value Ref Range Status   10/10/2020 26 21 - 32 mmol/L Final   09/28/2019 25 22 - 30 mmol/L Final   03/11/2019 23 22 - 30 mmol/L Final   07/31/2018 26 20 - 31 mmol/L Final   06/14/2018 27 22 - 30 MMOL/L Final   05/07/2018 23 22 - 30 MMOL/L Final     Anion Gap   Date Value Ref Range Status   06/14/2018 9 5 - 14 MMOL/L Final   05/07/2018 10 5 - 14 MMOL/L Final     BUN   Date Value Ref Range Status   10/10/2020 17 5 - 25 mg/dL Final   09/28/2019 21 5 - 25 mg/dL Final   03/11/2019 14 5 - 25 mg/dL Final   07/31/2018 15 7 - 25 mg/dL Final   06/14/2018 13 5 - 25 MG/DL Final   05/07/2018 19 5 - 25 MG/DL Final     Creatinine   Date Value Ref Range Status   10/10/2020 0 98 0 60 - 1 30 mg/dL Final     Comment:     Standardized to IDMS reference method   09/28/2019 0 99 0 60 - 1 20 mg/dL Final     Comment:     Standardized to IDMS reference method   03/11/2019 0 76 0 60 - 1 20 mg/dL Final     Comment:     Standardized to IDMS reference method     eGFR   Date Value Ref Range Status   10/10/2020 53 ml/min/1 73sq m Final   09/28/2019 53 (L) >60 ml/min/1 73sq m Final   03/11/2019 73 >60 ml/min/1 73sq m Final     Glucose   Date Value Ref Range Status   06/14/2018 103 (H) 70 - 99 MG/DL Final   05/07/2018 155 (H) 70 - 99 MG/DL Final     Calcium   Date Value Ref Range Status   10/10/2020 9 1 8 3 - 10 1 mg/dL Final   09/28/2019 9 5 8 4 - 10 2 mg/dL Final   03/11/2019 9 3 8 4 - 10 2 mg/dL Final   07/31/2018 9 3 8 6 - 10 4 mg/dL Final 06/14/2018 9 4 8 4 - 10 2 MG/DL Final   05/07/2018 9 3 8 4 - 10 2 MG/DL Final     AST   Date Value Ref Range Status   10/10/2020 16 5 - 45 U/L Final     Comment:     Specimen collection should occur prior to Sulfasalazine administration due to the potential for falsely depressed results  09/28/2019 19 14 - 36 U/L Final     Comment:       Specimen collection should occur prior to Sulfasalazine administration due to the potential for falsely depressed results  07/31/2018 17 10 - 35 U/L Final   05/07/2018 20 14 - 36 U/L Final     ALT   Date Value Ref Range Status   10/10/2020 17 12 - 78 U/L Final     Comment:     Specimen collection should occur prior to Sulfasalazine administration due to the potential for falsely depressed results  09/28/2019 19 9 - 52 U/L Final     Comment:       Specimen collection should occur prior to Sulfasalazine administration due to the potential for falsely depressed results      07/31/2018 9 6 - 29 U/L Final   05/07/2018 23 9 - 52 U/L Final     Alkaline Phosphatase   Date Value Ref Range Status   10/10/2020 75 46 - 116 U/L Final   09/28/2019 61 43 - 122 U/L Final   07/31/2018 60 33 - 130 U/L Final   05/07/2018 65 43 - 122 U/L Final     Total Protein   Date Value Ref Range Status   05/07/2018 6 6 5 9 - 8 4 G/DL Final     Total Bilirubin   Date Value Ref Range Status   05/07/2018 0 2 <1 3 mg/dL Final     WBC   Date Value Ref Range Status   10/10/2020 6 93 4 31 - 10 16 Thousand/uL Final   09/28/2019 6 60 4 31 - 10 16 Thousand/uL Final   07/22/2019 6 50 4 31 - 10 16 Thousand/uL Final   06/14/2018 5 3 4 5 - 11 0 K/MCL Final     Hemoglobin   Date Value Ref Range Status   10/10/2020 10 9 (L) 11 5 - 15 4 g/dL Final   09/28/2019 9 7 (L) 12 0 - 16 0 g/dL Final   07/22/2019 10 2 (L) 12 0 - 16 0 g/dL Final   06/14/2018 10 2 (L) 12 0 - 16 0 G/DL Final     Platelets   Date Value Ref Range Status   10/10/2020 237 149 - 390 Thousands/uL Final   09/28/2019 158 150 - 450 Thousands/uL Final 07/22/2019 162 150 - 450 Thousands/uL Final   06/14/2018 149 (L) 150 - 450 K/MCL Final     No results found for: PT, PTT, INR  No results found for: CKMB, DIGOXIN  TSH   Date Value Ref Range Status   07/31/2018 1 43 0 40 - 4 50 mIU/L Final     No results found for: CHOL   Hemoglobin A1C   Date Value Ref Range Status   03/13/2021 6 6 (H) <5 7 % Final     Comment:     Reference Range  Non-diabetic                     <5 7  Pre-diabetic                     5 7-6 4  Diabetic                         >=6 5  ADA target for diabetic control  <=7     Urine Culture   Date Value Ref Range Status   03/27/2019 >100,000 cfu/ml Escherichia coli (A)  Final   01/25/2019 10,000-19,000 cfu/ml   Final     Comment:     Mixed Contaminants X3       *-*-*-*-*-*-*-*-*-*-*-*-*-*-*-*-*-*-*-*-*-*-*-*-*-*-*-*-*-*-*-*-*-*-*-*-*-*-*-*-*-*-*-*-*-*-*-*-*-*-*-*-*-*-  PREVIOUS CARDIOLOGY & RADIOLOGY RESULTS:  No results found for this or any previous visit  No results found for this or any previous visit  No results found for this or any previous visit  No results found for this or any previous visit  DXA bone density spine hip and pelvis  Narrative: DXA SCAN    CLINICAL HISTORY:  59-year-old woman  Menopause at age 55  OTHER RISK FACTORS:  None  TECHNIQUE: Bone densitometry was performed using a [employer] Discovery C bone densitometer  Regions of interest appear properly placed  COMPARISON: 7/21/2016  RESULTS:   LUMBAR SPINE L1-L4 (L2): BMD  1 232  gm/cm2 / T-score 1 6 / Z score 4 5  These values are artifactually elevated due to degenerative sclerosis and osteophytosis  (Lumbar levels within parentheses represent vertebrae excluded from analysis due to local structural abnormalities or artifact)      LEFT  TOTAL HIP: BMD 0 783  gm/cm2 / T-score -1 3 / Z score 0 9  LEFT  FEMORAL NECK: BMD 0 739  gm/cm2 / T score -1 1 / Z score 1 3    LEFT  FOREARM:  33% RADIUS BMD  0 649  gm/cm2 / T-score -0 6 / Z score 3 1    CHANGE: Since the last DXA:  LUMBAR SPINE BMD has INCREASED  0 053 gm/cm2 or 4 5%  HIP BMD has DECREASED  0 013 gm/cm2 or 1 7%  FOREARM BMD has  DECREASED  0 027 gm/cm2 or 4 1%  The significance of these changes is unknown because precision analysis for this unit has not been completed  Impression: 1  Low bone mass (osteopenia)  2   The statistical significance of BMD change since the earlier DXA study from 7/21/2016 can not be determined because precision analysis of this unit has not been completed  3   The 10 year risk of hip fracture is 1 5% with the 10 year risk of major osteoporotic fracture being 6 9% as calculated by the [facility]/WHO fracture risk assessment tool (FRAX)  4   The current NOF guidelines recommend treating patients with a T-score of -2 5 or less in the lumbar spine or hips, or in post-menopausal women and men over the age of 48 with low bone mass (osteopenia) and a FRAX 10 year risk score of >3% for hip   fracture and/or >20% for major osteoporotic fracture  5   The NOF recommends follow-up DXA in 1-2 years after initiating therapy for osteoporosis and every 2 years thereafter  More frequent evaluation is appropriate for patients with conditions associated with rapid bone loss, such as glucocorticoid   therapy  The interval between DXA screenings may be longer for individuals without major risk factors and initial T-score in the normal or upper low bone mass range  The FRAX algorithm has certain limitations:  -FRAX has not been validated in patients currently or previously treated with pharmacotherapy for osteoporosis  In such patients, clinical judgment must be exercised in interpreting FRAX scores      -Prior hip, vertebral and humeral fragility fractures appear to confer greater risk of subsequent fracture than fractures at other sites (this is especially true for individuals with severe vertebral fractures), but quantification of this incremental   risk is not possible with FRAX  -FRAX underestimates fracture risk in patients with history of multiple fragility fractures  -FRAX may underestimate fracture risk in patients with history of frequent falls   -It is not appropriate to use FRAX to monitor treatment response      WHO CLASSIFICATION:  Normal (a T-score of -1 0 or higher)  Low bone mineral density (a T-score of less than -1 0 but higher than -2 5)  Osteoporosis (a T-score of -2 5 or less)  Severe osteoporosis (a T-score of -2 5 or less with a fragility fracture)    Workstation performed: [id number]        *-*-*-*-*-*-*-*-*-*-*-*-*-*-*-*-*-*-*-*-*-*-*-*-*-*-*-*-*-*-*-*-*-*-*-*-*-*-*-*-*-*-*-*-*-*-*-*-*-*-*-*-*-*-  SIGNATURES:   @HDO@   Ana Howard MD     *-*-*-*-*-*-*-*-*-*-*-*-*-*-*-*-*-*-*-*-*-*-*-*-*-*-*-*-*-*-*-*-*-*-*-*-*-*-*-*-*-*-*-*-*-*-*-*-*-*-*-*-*-*-    PAST MEDICAL HISTORY:  Past Medical History:   Diagnosis Date    Anemia     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 )     Bilateral carotid artery stenosis     Cardiomegaly     Chicken pox     Chronic kidney disease, unspecified     Diabetes mellitus ([facility] )     Hypertension     Menopausal state     Umbilical hernia     Vitamin D deficiency     PAST SURGICAL HISTORY:   Past Surgical History:   Procedure Laterality Date    APPENDECTOMY      CHOLECYSTECTOMY      UMBILICAL HERNIA REPAIR           FAMILY HISTORY:  Family History   Problem Relation Age of Onset    Diabetes Mother     Colon cancer Family     SOCIAL HISTORY:  Social History     Tobacco Use   Smoking Status Never Smoker   Smokeless Tobacco Never Used   Tobacco Comment    no passive smoke exposure      Social History     Substance and Sexual Activity   Alcohol Use No     Social History     Substance and Sexual Activity   Drug Use No    [id number]     *-*-*-*-*-*-*-*-*-*-*-*-*-*-*-*-*-*-*-*-*-*-*-*-*-*-*-*-*-*-*-*-*-*-*-*-*-*-*-*-*-*-*-*-*-*-*-*-*-*-*-*-*-*  ALLERGIES:  No Known Allergies CURRENT SCHEDULED MEDICATIONS:    Current Outpatient Medications:    ammonium lactate (LAC-HYDRIN) 12 % cream, Apply topically, Disp: , Rfl:     aspirin 81 MG tablet, Take 81 mg by mouth daily  , Disp: , Rfl:     clotrimazole-betamethasone (LOTRISONE) 1-0 05 % cream, Apply topically 2 (two) times a day, Disp: , Rfl:     Docusate Sodium 100 MG capsule, Take 100 mg by mouth daily, Disp: , Rfl:     furosemide (LASIX) 20 mg tablet, take 1 tablet by mouth once daily PRN, Disp: , Rfl:     ibuprofen (MOTRIN) 200 mg tablet, Take 400 mg by mouth every 8 (eight) hours as needed, Disp: , Rfl:     linaCLOtide 72 MCG CAPS, Take by mouth, Disp: , Rfl:     lisinopril (ZESTRIL) 10 mg tablet, take 1 tablet by mouth once daily at bedtime (Patient taking differently: 20 mg ), Disp: 90 tablet, Rfl: 3    magnesium 30 MG tablet, Take 30 mg by mouth 2 (two) times a day, Disp: , Rfl:     methimazole (TAPAZOLE) 5 mg tablet, Take 1/2 tab BID by mouth daily, Disp: 30 tablet, Rfl: 0    metoprolol succinate (TOPROL-XL) 25 mg 24 hr tablet, Take 1 tablet (25 mg total) by mouth daily, Disp: 90 tablet, Rfl: 3    Multiple Vitamins-Minerals (MULTIVITAMIN ADULT PO), Take 1 tablet po once daily , Disp: , Rfl:     PHARMACIST CHOICE ALCOHOL 70 % PADS, USE 3 TIMES A DAY AFTER CHECKING BLOOD GLUCOSE, Disp: , Rfl: 3    pregabalin (LYRICA) 75 mg capsule, take 1 capsule by mouth daily, Disp: 90 capsule, Rfl: 0    simvastatin (ZOCOR) 40 mg tablet, take 1 tablet (40MG)  by oral route  every day in the evening, Disp: , Rfl:     SITagliptin-MetFORMIN HCl ER (JANUMET XR)  MG TB24, Take 1 tablet by mouth daily  , Disp: , Rfl:     VITAMIN D PO, Take 50,000 mg by mouth, Disp: , Rfl:     Ascorbic Acid (VITAMIN C PO), Take 150 mg by mouth, Disp: , Rfl:      *-*-*-*-*-*-*-*-*-*-*-*-*-*-*-*-*-*-*-*-*-*-*-*-*-*-*-*-*-*-*-*-*-*-*-*-*-*-*-*-*-*-*-*-*-*-*-*-*-*-*-*-*-*

## 2021-09-24 ENCOUNTER — OFFICE VISIT (OUTPATIENT)
Dept: CARDIOLOGY CLINIC | Facility: CLINIC | Age: 86
End: 2021-09-24
Payer: COMMERCIAL

## 2021-09-24 VITALS
DIASTOLIC BLOOD PRESSURE: 64 MMHG | WEIGHT: 123.2 LBS | BODY MASS INDEX: 26.58 KG/M2 | HEART RATE: 71 BPM | HEIGHT: 57 IN | SYSTOLIC BLOOD PRESSURE: 130 MMHG

## 2021-09-24 DIAGNOSIS — I10 ESSENTIAL HYPERTENSION: Primary | ICD-10-CM

## 2021-09-24 DIAGNOSIS — R07.89 CHEST WALL PAIN: ICD-10-CM

## 2021-09-24 DIAGNOSIS — E78.2 MIXED HYPERLIPIDEMIA: ICD-10-CM

## 2021-09-24 DIAGNOSIS — I49.1 ISOLATED PREMATURE ATRIAL CONTRACTIONS: ICD-10-CM

## 2021-09-24 PROCEDURE — 99214 OFFICE O/P EST MOD 30 MIN: CPT | Performed by: INTERNAL MEDICINE

## 2021-09-24 NOTE — PATIENT INSTRUCTIONS
CARDIOLOGY ASSESSMENT & PLAN     1  Essential hypertension     2  Isolated premature atrial contractions     3  Chest wall pain     4  Mixed hyperlipidemia       Essential hypertension  Ms Gigi Courtney is overall well from cardiac perspective  Her blood pressure is well controlled  She has musculoskeletal pain in the left back region  I could not identify focal area of tenderness  She has significant stress relating to her children  She does have adequate family support  Her last blood work was at [facility] and it was in good range  She closely follows with her endocrinologist   She has upcoming appointment with primary care physician in October  -- at this time I am advising her to continue current medications  -- I am encouraging her to follow with primary physician regularly and seek help if she feels depressed  -- at this time from cardiac perspective I do not intend to do any stress test   She will have routine blood work checked after her primary physician/endocrinologist visit  -- Dietary and medical compliance are reinforced  -- Advised  to report any concerning symptoms such as chest pain, shortness of breath, decline in exercise tolerance or presyncope/syncope

## 2021-09-24 NOTE — PROGRESS NOTES
[facility]  [address] [facility], [place] [address]  Phone#  [phone]  Fax#  [phone]  *-*-*-*-*-*-*-*-*-*-*-*-*-*-*-*-*-*-*-*-*-*-*-*-*-*-*-*-*-*-*-*-*-*-*-*-*-*-*-*-*-*-*-*-*-*-*-*-*-*-*-*-*-*  ENCOUNTER DATE: 09/24/21 11:05 AM  PATIENT NAME: Cristina Steele   1935    [id number]  AGE:85 y o  SEX: female  Rosalee Alejo MD     PRIMARY CARE PHYSICIAN: Jesus Smith DO    DIAGNOSES:  1  Benign essential hypertension  2  Mixed dyslipidemia  3  History of chest pain  4  History of diabetes mellitus  5  Hypo and hyperthyroidism  6  Anemia  7  Degenerative joint disease  8  History of diverticulosis  9  Cervical radiculopathy  10 History of carotid artery disease  11  History of cholecystectomy and appendectomy  12  Vitamin-D deficiency  13  History of umbilical hernia    Dobutamine stress echocardiogram at [facility] in March 2017 was negative for ischemia  EF was reported as 60%  No significant valvular abnormality  CURRENT ECG   No results found for this visit on 09/24/21  CARDIOLOGY ASSESSMENT & PLAN     1  Essential hypertension     2  Isolated premature atrial contractions     3  Chest wall pain     4  Mixed hyperlipidemia       Essential hypertension  Ms Cristina Steele is overall well from cardiac perspective  Her blood pressure is well controlled  She has musculoskeletal pain in the left back region  I could not identify focal area of tenderness  She has significant stress relating to her children  She does have adequate family support  Her last blood work was at [facility] and it was in good range  She closely follows with her endocrinologist   She has upcoming appointment with primary care physician in October  -- at this time I am advising her to continue current medications  -- I am encouraging her to follow with primary physician regularly and seek help if she feels depressed    -- at this time from cardiac perspective I do not intend to do any stress test   She will have routine blood work checked after her primary physician/endocrinologist visit  -- Dietary and medical compliance are reinforced  -- Advised  to report any concerning symptoms such as chest pain, shortness of breath, decline in exercise tolerance or presyncope/syncope  INTERVAL HISTORY & HISTORY OF PRESENT ILLNESS     Gypsy Salomon is here for follow-up regarding her cardiac comorbidities which include: Hypertension, dyslipidemia and other comorbidities  She has been overall all right from cardiac perspective but continues to experience the chest wall pain which has been chronic for her  She has significant stress because 2 of her daughters are in nursing home with advanced neurologic disorders  She visits often and concerned about them  She denies typical angina-like symptoms, shortness of breath, orthopnea, PND or pedal edema  She does report frequent headaches  Has had no presyncope/syncope  She does have adequate family support  Functional capacity status: Moderate to good   (Excellent- >10 METs; Good: (7-10 METs); Moderate (4-7 METs); Poor (<= 4 METs)    Any chronic stressors:  Stress related to having 2 children in nursing home   (feeling of poor health, financial problems, and social isolation etc)  Tobacco or alcohol dependence: None    Current cardiac medications:  Lisinopril 20 mg daily metoprolol succinate 25 mg daily, simvastatin 40 mg daily, Magnesium 30 mg daily  REVIEW OF SYSTEMS   Positive for:  As noted above in HPI  Negative for: All remaining as reviewed below and in HPI      SYSTEM SYMPTOMS REVIEWED:  General--weight change, fever, night sweats  Respiratory--cough, wheezing, shortness of breath, sputum production  Cardiovascular--chest pain, syncope, dyspnea on exertion, edema, decline in exercise tolerance, claudication   Gastrointestinal--persistent vomiting, diarrhea, abdominal distention, blood in stool   Muscular or skeletal--joint pain or swelling   Neurologic--headaches, syncope, abnormal movement  Hematologic--history of easy bruising and bleeding   Endocrine--thyroid enlargement, heat or cold intolerance, polyuria   Psychiatric--anxiety, depression     *-*-*-*-*-*-*-*-*-*-*-*-*-*-*-*-*-*-*-*-*-*-*-*-*-*-*-*-*-*-*-*-*-*-*-*-*-*-*-*-*-*-*-*-*-*-*-*-*-*-*-*-*-*-  VITAL SIGNS     CURRENT VITAL SIGNS:   Vitals:    09/24/21 1037   BP: 130/64   Pulse: 71   Weight: 55 9 kg (123 lb 3 2 oz)   Height: 4' 9" (1 448 m)       BMI: Body mass index is 26 66 kg/m²  WEIGHTS:   Wt Readings from Last 25 Encounters:   09/24/21 55 9 kg (123 lb 3 2 oz)   03/18/21 57 2 kg (126 lb)   09/19/19 60 3 kg (133 lb)   06/27/19 61 2 kg (135 lb)   03/27/19 60 8 kg (134 lb)   02/27/19 61 1 kg (134 lb 9 6 oz)   01/17/19 61 8 kg (136 lb 3 2 oz)   01/02/19 60 5 kg (133 lb 6 4 oz)   11/28/18 61 6 kg (135 lb 12 8 oz)   10/31/18 61 5 kg (135 lb 9 6 oz)   10/28/18 62 1 kg (137 lb)   08/30/18 62 1 kg (136 lb 12 8 oz)        *-*-*-*-*-*-*-*-*-*-*-*-*-*-*-*-*-*-*-*-*-*-*-*-*-*-*-*-*-*-*-*-*-*-*-*-*-*-*-*-*-*-*-*-*-*-*-*-*-*-*-*-*-*-  PHYSICAL EXAM     General Appearance:    Alert, cooperative, no distress, appears stated age   Head, Eyes, ENT:    No obvious abnormality, moist mucous mebranes  Neck:   Supple, no carotid bruit or JVD   Back:     Symmetric, no curvature  Lungs:     Respirations unlabored  Clear to auscultation bilaterally,    Chest wall:    No tenderness or deformity   Heart:    Regular rate and rhythm, S1 and S2 normal, no murmur, rub  or gallop  Abdomen:     Soft, non-tender, No obvious masses, or organomegaly   Extremities:   Extremities warm, no cyanosis or edema    Skin:   no venostatic changes in lower extremities  Normal skin color, texture, and turgor   No rashes or lesions     *-*-*-*-*-*-*-*-*-*-*-*-*-*-*-*-*-*-*-*-*-*-*-*-*-*-*-*-*-*-*-*-*-*-*-*-*-*-*-*-*-*-*-*-*-*-*-*-*-*-*-*-*-*-  CURRENT MEDICATIONS LIST     Current Outpatient Medications:     ammonium lactate (LAC-HYDRIN) 12 % cream, Apply topically, Disp: , Rfl:     aspirin 81 MG tablet, Take 81 mg by mouth daily  , Disp: , Rfl:     Docusate Sodium 100 MG capsule, Take 100 mg by mouth daily, Disp: , Rfl:     furosemide (LASIX) 20 mg tablet, Take 20 mg by mouth daily , Disp: , Rfl:     ibuprofen (MOTRIN) 200 mg tablet, Take 400 mg by mouth every 8 (eight) hours as needed, Disp: , Rfl:     linaCLOtide 72 MCG CAPS, Take by mouth, Disp: , Rfl:     lisinopril (ZESTRIL) 10 mg tablet, take 1 tablet by mouth once daily at bedtime (Patient taking differently: 20 mg ), Disp: 90 tablet, Rfl: 3    magnesium 30 MG tablet, Take 30 mg by mouth 2 (two) times a day, Disp: , Rfl:     methimazole (TAPAZOLE) 5 mg tablet, Take 1/2 tab BID by mouth daily, Disp: 30 tablet, Rfl: 0    metoprolol succinate (TOPROL-XL) 25 mg 24 hr tablet, Take 1 tablet (25 mg total) by mouth daily, Disp: 90 tablet, Rfl: 3    Multiple Vitamins-Minerals (MULTIVITAMIN ADULT PO), Take 1 tablet po once daily , Disp: , Rfl:     simvastatin (ZOCOR) 40 mg tablet, take 1 tablet (40MG)  by oral route  every day in the evening, Disp: , Rfl:     SITagliptin-MetFORMIN HCl ER (JANUMET XR)  MG TB24, Take 1 tablet by mouth daily  , Disp: , Rfl:     VITAMIN D PO, Take 50,000 mg by mouth, Disp: , Rfl:     Ascorbic Acid (VITAMIN C PO), Take 150 mg by mouth, Disp: , Rfl:     PHARMACIST CHOICE ALCOHOL 70 % PADS, USE 3 TIMES A DAY AFTER CHECKING BLOOD GLUCOSE, Disp: , Rfl: 3    pregabalin (LYRICA) 75 mg capsule, take 1 capsule by mouth daily, Disp: 90 capsule, Rfl: 0       ALLERGIES   No Known Allergies    *-*-*-*-*-*-*-*-*-*-*-*-*-*-*-*-*-*-*-*-*-*-*-*-*-*-*-*-*-*-*-*-*-*-*-*-*-*-*-*-*-*-*-*-*-*-*-*-*-*-*-*-*-*-  LABORATORY DATA     Sodium   Date Value Ref Range Status   06/14/2018 140 137 - 147 MEQ/L Final   05/07/2018 143 137 - 147 MEQ/L Final     Sodium   Date Value Ref Range Status 06/14/2018 140 137 - 147 MEQ/L Final   05/07/2018 143 137 - 147 MEQ/L Final     Potassium   Date Value Ref Range Status   10/10/2020 4 4 3 5 - 5 3 mmol/L Final   09/28/2019 4 5 3 6 - 5 0 mmol/L Final   03/11/2019 4 7 3 6 - 5 0 mmol/L Final   07/31/2018 4 3 3 5 - 5 3 mmol/L Final   06/14/2018 4 5 3 6 - 5 0 MEQ/L Final   05/07/2018 4 5 3 6 - 5 0 MEQ/L Final     Chloride   Date Value Ref Range Status   10/10/2020 104 100 - 108 mmol/L Final   09/28/2019 112 (H) 97 - 108 mmol/L Final   03/11/2019 104 97 - 108 mmol/L Final   07/31/2018 107 98 - 110 mmol/L Final   06/14/2018 104 97 - 108 MEQ/L Final   05/07/2018 110 (H) 97 - 108 MEQ/L Final     CO2   Date Value Ref Range Status   10/10/2020 26 21 - 32 mmol/L Final   09/28/2019 25 22 - 30 mmol/L Final   03/11/2019 23 22 - 30 mmol/L Final   07/31/2018 26 20 - 31 mmol/L Final   06/14/2018 27 22 - 30 MMOL/L Final   05/07/2018 23 22 - 30 MMOL/L Final     Anion Gap   Date Value Ref Range Status   06/14/2018 9 5 - 14 MMOL/L Final   05/07/2018 10 5 - 14 MMOL/L Final     BUN   Date Value Ref Range Status   10/10/2020 17 5 - 25 mg/dL Final   09/28/2019 21 5 - 25 mg/dL Final   03/11/2019 14 5 - 25 mg/dL Final   07/31/2018 15 7 - 25 mg/dL Final   06/14/2018 13 5 - 25 MG/DL Final   05/07/2018 19 5 - 25 MG/DL Final     Creatinine   Date Value Ref Range Status   10/10/2020 0 98 0 60 - 1 30 mg/dL Final     Comment:     Standardized to IDMS reference method   09/28/2019 0 99 0 60 - 1 20 mg/dL Final     Comment:     Standardized to IDMS reference method   03/11/2019 0 76 0 60 - 1 20 mg/dL Final     Comment:     Standardized to IDMS reference method     eGFR   Date Value Ref Range Status   10/10/2020 53 ml/min/1 73sq m Final   09/28/2019 53 (L) >60 ml/min/1 73sq m Final   03/11/2019 73 >60 ml/min/1 73sq m Final     Glucose   Date Value Ref Range Status   06/14/2018 103 (H) 70 - 99 MG/DL Final   05/07/2018 155 (H) 70 - 99 MG/DL Final     Calcium   Date Value Ref Range Status   10/10/2020 9 1 8 3 - 10 1 mg/dL Final   09/28/2019 9 5 8 4 - 10 2 mg/dL Final   03/11/2019 9 3 8 4 - 10 2 mg/dL Final   07/31/2018 9 3 8 6 - 10 4 mg/dL Final   06/14/2018 9 4 8 4 - 10 2 MG/DL Final   05/07/2018 9 3 8 4 - 10 2 MG/DL Final     AST   Date Value Ref Range Status   10/10/2020 16 5 - 45 U/L Final     Comment:     Specimen collection should occur prior to Sulfasalazine administration due to the potential for falsely depressed results  09/28/2019 19 14 - 36 U/L Final     Comment:       Specimen collection should occur prior to Sulfasalazine administration due to the potential for falsely depressed results  07/31/2018 17 10 - 35 U/L Final   05/07/2018 20 14 - 36 U/L Final     ALT   Date Value Ref Range Status   10/10/2020 17 12 - 78 U/L Final     Comment:     Specimen collection should occur prior to Sulfasalazine administration due to the potential for falsely depressed results  09/28/2019 19 9 - 52 U/L Final     Comment:       Specimen collection should occur prior to Sulfasalazine administration due to the potential for falsely depressed results      07/31/2018 9 6 - 29 U/L Final   05/07/2018 23 9 - 52 U/L Final     Alkaline Phosphatase   Date Value Ref Range Status   10/10/2020 75 46 - 116 U/L Final   09/28/2019 61 43 - 122 U/L Final   07/31/2018 60 33 - 130 U/L Final   05/07/2018 65 43 - 122 U/L Final     Total Protein   Date Value Ref Range Status   05/07/2018 6 6 5 9 - 8 4 G/DL Final     Total Bilirubin   Date Value Ref Range Status   05/07/2018 0 2 <1 3 mg/dL Final     WBC   Date Value Ref Range Status   10/10/2020 6 93 4 31 - 10 16 Thousand/uL Final   09/28/2019 6 60 4 31 - 10 16 Thousand/uL Final   07/22/2019 6 50 4 31 - 10 16 Thousand/uL Final   06/14/2018 5 3 4 5 - 11 0 K/MCL Final     Hemoglobin   Date Value Ref Range Status   10/10/2020 10 9 (L) 11 5 - 15 4 g/dL Final   09/28/2019 9 7 (L) 12 0 - 16 0 g/dL Final   07/22/2019 10 2 (L) 12 0 - 16 0 g/dL Final   06/14/2018 10 2 (L) 12 0 - 16 0 G/DL Final     Platelets   Date Value Ref Range Status   10/10/2020 237 149 - 390 Thousands/uL Final   09/28/2019 158 150 - 450 Thousands/uL Final   07/22/2019 162 150 - 450 Thousands/uL Final   06/14/2018 149 (L) 150 - 450 K/MCL Final     No results found for: PT, PTT, INR  No results found for: CKMB, DIGOXIN  TSH   Date Value Ref Range Status   07/31/2018 1 43 0 40 - 4 50 mIU/L Final     No results found for: CHOL   Hemoglobin A1C   Date Value Ref Range Status   03/13/2021 6 6 (H) <5 7 % Final     Comment:     Reference Range  Non-diabetic                     <5 7  Pre-diabetic                     5 7-6 4  Diabetic                         >=6 5  ADA target for diabetic control  <=7     Urine Culture   Date Value Ref Range Status   03/27/2019 >100,000 cfu/ml Escherichia coli (A)  Final   01/25/2019 10,000-19,000 cfu/ml   Final     Comment:     Mixed Contaminants X3       *-*-*-*-*-*-*-*-*-*-*-*-*-*-*-*-*-*-*-*-*-*-*-*-*-*-*-*-*-*-*-*-*-*-*-*-*-*-*-*-*-*-*-*-*-*-*-*-*-*-*-*-*-*-  PREVIOUS CARDIOLOGY & RADIOLOGY TEST RESULTS   I have personally reviewed pertinent results of cardiovascular tests noted below  No results found for this or any previous visit  No results found for this or any previous visit  No results found for this or any previous visit  No results found for this or any previous visit  DXA bone density spine hip and pelvis  Narrative: DXA SCAN    CLINICAL HISTORY:  49-year-old woman  Menopause at age 55  OTHER RISK FACTORS:  None  TECHNIQUE: Bone densitometry was performed using a [employer] C bone densitometer  Regions of interest appear properly placed  COMPARISON: 7/21/2016  RESULTS:   LUMBAR SPINE L1-L4 (L2): BMD  1 232  gm/cm2 / T-score 1 6 / Z score 4 5  These values are artifactually elevated due to degenerative sclerosis and osteophytosis    (Lumbar levels within parentheses represent vertebrae excluded from analysis due to local structural abnormalities or artifact)  LEFT  TOTAL HIP: BMD 0 783  gm/cm2 / T-score -1 3 / Z score 0 9  LEFT  FEMORAL NECK: BMD 0 739  gm/cm2 / T score -1 1 / Z score 1 3    LEFT  FOREARM:  33% RADIUS BMD  0 649  gm/cm2 / T-score -0 6 / Z score 3 1    CHANGE: Since the last DXA:  LUMBAR SPINE BMD has INCREASED  0 053 gm/cm2 or 4 5%  HIP BMD has DECREASED  0 013 gm/cm2 or 1 7%  FOREARM BMD has  DECREASED  0 027 gm/cm2 or 4 1%  The significance of these changes is unknown because precision analysis for this unit has not been completed  Impression: 1  Low bone mass (osteopenia)  2   The statistical significance of BMD change since the earlier DXA study from 7/21/2016 can not be determined because precision analysis of this unit has not been completed  3   The 10 year risk of hip fracture is 1 5% with the 10 year risk of major osteoporotic fracture being 6 9% as calculated by the [facility]/WHO fracture risk assessment tool (FRAX)  4   The current NOF guidelines recommend treating patients with a T-score of -2 5 or less in the lumbar spine or hips, or in post-menopausal women and men over the age of 48 with low bone mass (osteopenia) and a FRAX 10 year risk score of >3% for hip   fracture and/or >20% for major osteoporotic fracture  5   The NOF recommends follow-up DXA in 1-2 years after initiating therapy for osteoporosis and every 2 years thereafter  More frequent evaluation is appropriate for patients with conditions associated with rapid bone loss, such as glucocorticoid   therapy  The interval between DXA screenings may be longer for individuals without major risk factors and initial T-score in the normal or upper low bone mass range  The FRAX algorithm has certain limitations:  -FRAX has not been validated in patients currently or previously treated with pharmacotherapy for osteoporosis  In such patients, clinical judgment must be exercised in interpreting FRAX scores      -Prior hip, vertebral and humeral fragility fractures appear to confer greater risk of subsequent fracture than fractures at other sites (this is especially true for individuals with severe vertebral fractures), but quantification of this incremental   risk is not possible with FRAX  -FRAX underestimates fracture risk in patients with history of multiple fragility fractures  -FRAX may underestimate fracture risk in patients with history of frequent falls   -It is not appropriate to use FRAX to monitor treatment response      WHO CLASSIFICATION:  Normal (a T-score of -1 0 or higher)  Low bone mineral density (a T-score of less than -1 0 but higher than -2 5)  Osteoporosis (a T-score of -2 5 or less)  Severe osteoporosis (a T-score of -2 5 or less with a fragility fracture)    Workstation performed: [id number]        *-*-*-*-*-*-*-*-*-*-*-*-*-*-*-*-*-*-*-*-*-*-*-*-*-*-*-*-*-*-*-*-*-*-*-*-*-*-*-*-*-*-*-*-*-*-*-*-*-*-*-*-*-*-  SIGNATURES:   @IDJ@   Ana Howard MD; MHA    *-*-*-*-*-*-*-*-*-*-*-*-*-*-*-*-*-*-*-*-*-*-*-*-*-*-*-*-*-*-*-*-*-*-*-*-*-*-*-*-*-*-*-*-*-*-*-*-*-*-*-*-*-*-  PAST MEDICAL HISTORY:  Past Medical History:   Diagnosis Date    Anemia     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[facility] )     Bilateral carotid artery stenosis     Cardiomegaly     Chicken pox     Chronic kidney disease, unspecified     Diabetes mellitus ([facility] )     Hypertension     Menopausal state     Umbilical hernia     Vitamin D deficiency     PAST SURGICAL HISTORY:  Past Surgical History:   Procedure Laterality Date    APPENDECTOMY      CHOLECYSTECTOMY      UMBILICAL HERNIA REPAIR           FAMILY HISTORY:  Family History   Problem Relation Age of Onset    Diabetes Mother     Colon cancer Family     SOCIAL HISTORY:  Social History     Tobacco Use   Smoking Status Never Smoker   Smokeless Tobacco Never Used   Tobacco Comment    no passive smoke exposure      Social History     Substance and Sexual Activity   Alcohol Use No     Social History     Substance and Sexual Activity   Drug Use No    [unfilled]     *-*-*-*-*-*-*-*-*-*-*-*-*-*-*-*-*-*-*-*-*-*-*-*-*-*-*-*-*-*-*-*-*-*-*-*-*-*-*-*-*-*-*-*-*-*-*-*-*-*-*-*-*-*  ALLERGIES:  No Known Allergies CURRENT SCHEDULED MEDICATIONS:    Current Outpatient Medications:     ammonium lactate (LAC-HYDRIN) 12 % cream, Apply topically, Disp: , Rfl:     aspirin 81 MG tablet, Take 81 mg by mouth daily  , Disp: , Rfl:     Docusate Sodium 100 MG capsule, Take 100 mg by mouth daily, Disp: , Rfl:     furosemide (LASIX) 20 mg tablet, Take 20 mg by mouth daily , Disp: , Rfl:     ibuprofen (MOTRIN) 200 mg tablet, Take 400 mg by mouth every 8 (eight) hours as needed, Disp: , Rfl:     linaCLOtide 72 MCG CAPS, Take by mouth, Disp: , Rfl:     lisinopril (ZESTRIL) 10 mg tablet, take 1 tablet by mouth once daily at bedtime (Patient taking differently: 20 mg ), Disp: 90 tablet, Rfl: 3    magnesium 30 MG tablet, Take 30 mg by mouth 2 (two) times a day, Disp: , Rfl:     methimazole (TAPAZOLE) 5 mg tablet, Take 1/2 tab BID by mouth daily, Disp: 30 tablet, Rfl: 0    metoprolol succinate (TOPROL-XL) 25 mg 24 hr tablet, Take 1 tablet (25 mg total) by mouth daily, Disp: 90 tablet, Rfl: 3    Multiple Vitamins-Minerals (MULTIVITAMIN ADULT PO), Take 1 tablet po once daily , Disp: , Rfl:     simvastatin (ZOCOR) 40 mg tablet, take 1 tablet (40MG)  by oral route  every day in the evening, Disp: , Rfl:     SITagliptin-MetFORMIN HCl ER (JANUMET XR)  MG TB24, Take 1 tablet by mouth daily  , Disp: , Rfl:     VITAMIN D PO, Take 50,000 mg by mouth, Disp: , Rfl:     Ascorbic Acid (VITAMIN C PO), Take 150 mg by mouth, Disp: , Rfl:     PHARMACIST CHOICE ALCOHOL 70 % PADS, USE 3 TIMES A DAY AFTER CHECKING BLOOD GLUCOSE, Disp: , Rfl: 3    pregabalin (LYRICA) 75 mg capsule, take 1 capsule by mouth daily, Disp: 90 capsule, Rfl: 0     *-*-*-*-*-*-*-*-*-*-*-*-*-*-*-*-*-*-*-*-*-*-*-*-*-*-*-*-*-*-*-*-*-*-*-*-*-*-*-*-*-*-*-*-*-*-*-*-*-*-*-*-*-*

## 2021-09-24 NOTE — ASSESSMENT & PLAN NOTE
Ms Cristina Steele is overall well from cardiac perspective  Her blood pressure is well controlled  She has musculoskeletal pain in the left back region  I could not identify focal area of tenderness  She has significant stress relating to her children  She does have adequate family support  Her last blood work was at [facility] and it was in good range  She closely follows with her endocrinologist   She has upcoming appointment with primary care physician in October  -- at this time I am advising her to continue current medications  -- I am encouraging her to follow with primary physician regularly and seek help if she feels depressed  -- at this time from cardiac perspective I do not intend to do any stress test   She will have routine blood work checked after her primary physician/endocrinologist visit  -- Dietary and medical compliance are reinforced  -- Advised  to report any concerning symptoms such as chest pain, shortness of breath, decline in exercise tolerance or presyncope/syncope

## 2021-11-08 ENCOUNTER — HOSPITAL ENCOUNTER (OUTPATIENT)
Dept: BONE DENSITY | Facility: CLINIC | Age: 86
Discharge: HOME | End: 2021-11-08
Payer: COMMERCIAL

## 2021-11-08 DIAGNOSIS — M85.89 OTHER SPECIFIED DISORDERS OF BONE DENSITY AND STRUCTURE, MULTIPLE SITES: ICD-10-CM

## 2021-11-08 PROCEDURE — 77080 DXA BONE DENSITY AXIAL: CPT

## 2022-06-01 ENCOUNTER — OFFICE VISIT (OUTPATIENT)
Dept: URGENT CARE | Age: 87
End: 2022-06-01
Payer: COMMERCIAL

## 2022-06-01 VITALS
RESPIRATION RATE: 20 BRPM | WEIGHT: 121.6 LBS | HEART RATE: 70 BPM | DIASTOLIC BLOOD PRESSURE: 72 MMHG | SYSTOLIC BLOOD PRESSURE: 165 MMHG | OXYGEN SATURATION: 96 % | HEIGHT: 59 IN | BODY MASS INDEX: 24.52 KG/M2 | TEMPERATURE: 97.6 F

## 2022-06-01 DIAGNOSIS — N64.4 BREAST PAIN, LEFT: Primary | ICD-10-CM

## 2022-06-01 PROCEDURE — 99213 OFFICE O/P EST LOW 20 MIN: CPT

## 2022-06-01 RX ORDER — GABAPENTIN 100 MG/1
CAPSULE ORAL
COMMUNITY
Start: 2022-04-21

## 2022-06-01 RX ORDER — ERGOCALCIFEROL 1.25 MG/1
CAPSULE ORAL
COMMUNITY
Start: 2022-05-16

## 2022-06-01 RX ORDER — BLOOD SUGAR DIAGNOSTIC
STRIP MISCELLANEOUS
COMMUNITY
Start: 2022-02-28

## 2022-06-01 RX ORDER — ALENDRONATE SODIUM 35 MG/1
TABLET ORAL
COMMUNITY
Start: 2022-03-01

## 2022-06-01 RX ORDER — LISINOPRIL 20 MG/1
20 TABLET ORAL DAILY
COMMUNITY
Start: 2022-05-04

## 2022-06-01 NOTE — PROGRESS NOTES
[address] [facility]        NAME: Cristela Kovacs is a 80 y o  female  : 1935    MRN: [id number]  DATE: 2022  TIME: 4:58 PM    Assessment and Plan   Breast pain, left [N64 4]  1  Breast pain, left       The patient presents with 3 day onset of left breast pain  The pain is intermittent and is located in the breast tissue  She denies chest pain, palpitations, fevers  No SOB  No wounds or injury  No redness, drainage  She does state the left one has become bigger than before  Assessment notes tenderness to breast tissue   Left breast appears larger than the other which is noted to be different per patient  There were no masses palpated  No areas of redness or warmth  No drainage from nipple  No chest pain  No chest wall tenderness  Pain is intermittent and relieved with motrin  At this time advised patient I would notify provider of visit for further evaluation  Patient agreed with plan  Also advised if worsening symptoms or concerns to please go to ER  Patient Instructions       Follow up with PCP as soon as possible  Proceed to  ER if symptoms worsen  Chief Complaint     Chief Complaint   Patient presents with    Breast Pain     Pt states she feels discomfort and heaviness in her left breast x three days  Took Advil with relief  History of Present Illness       The patient presents with 3 day onset of left breast pain  The pain is intermittent and is located in the breast tissue  She denies chest pain, palpitations, fevers  No SOB  No wounds or injury  No redness, drainage  She does state the left one has become bigger than before  Review of Systems   Review of Systems   Constitutional: Negative for chills and fever  HENT: Negative for congestion, ear pain, postnasal drip, sinus pain and sore throat  Eyes: Negative for pain and itching  Respiratory: Negative for cough, shortness of breath and wheezing  Cardiovascular: Negative for chest pain and palpitations  Gastrointestinal: Negative for abdominal pain, constipation, diarrhea, nausea and vomiting  Genitourinary: Negative for difficulty urinating and hematuria  Musculoskeletal: Positive for myalgias  Negative for arthralgias  Skin: Negative for rash  Neurological: Negative for dizziness, light-headedness and headaches  Psychiatric/Behavioral: Negative for agitation and sleep disturbance  The patient is not nervous/anxious            Current Medications       Current Outpatient Medications:     Ascorbic Acid (VITAMIN C PO), Take 150 mg by mouth, Disp: , Rfl:     aspirin 81 MG tablet, Take 81 mg by mouth daily  , Disp: , Rfl:     Docusate Sodium 100 MG capsule, Take 100 mg by mouth daily, Disp: , Rfl:     furosemide (LASIX) 20 mg tablet, Take 20 mg by mouth daily , Disp: , Rfl:     ibuprofen (MOTRIN) 200 mg tablet, Take 400 mg by mouth every 8 (eight) hours as needed, Disp: , Rfl:     linaCLOtide 72 MCG CAPS, Take by mouth, Disp: , Rfl:     lisinopril (ZESTRIL) 10 mg tablet, take 1 tablet by mouth once daily at bedtime (Patient taking differently: 20 mg), Disp: 90 tablet, Rfl: 3    magnesium 30 MG tablet, Take 30 mg by mouth 2 (two) times a day, Disp: , Rfl:     methimazole (TAPAZOLE) 5 mg tablet, Take 1/2 tab BID by mouth daily, Disp: 30 tablet, Rfl: 0    metoprolol succinate (TOPROL-XL) 25 mg 24 hr tablet, Take 1 tablet (25 mg total) by mouth daily, Disp: 90 tablet, Rfl: 3    Multiple Vitamins-Minerals (MULTIVITAMIN ADULT PO), Take 1 tablet po once daily , Disp: , Rfl:     PHARMACIST CHOICE ALCOHOL 70 % PADS, USE 3 TIMES A DAY AFTER CHECKING BLOOD GLUCOSE, Disp: , Rfl: 3    pregabalin (LYRICA) 75 mg capsule, take 1 capsule by mouth daily, Disp: 90 capsule, Rfl: 0    simvastatin (ZOCOR) 40 mg tablet, take 1 tablet (40MG)  by oral route  every day in the evening, Disp: , Rfl:     SITagliptin-metFORMIN HCl ER  MG TB24, Take 1 tablet by mouth daily  , Disp: , Rfl:     VITAMIN D PO, Take 50,000 mg by mouth, Disp: , Rfl:     Accu-Chek Guide test strip, USE 1 TEST STRIP TO TEST BLOOD SUGAR TWICE DAILY, Disp: , Rfl:     alendronate (FOSAMAX) 35 mg tablet, TAKE 1 TABLET BY MOUTH IN THE MORNING EVERY WEEK WITH A FULL GLAS     (REFER TO PRESCRIPTION NOTES)  , Disp: , Rfl:     ammonium lactate (LAC-HYDRIN) 12 % cream, Apply topically, Disp: , Rfl:     ergocalciferol (VITAMIN D2) 50,000 units, TAKE 1 CAPSULE BY MOUTH EVERY OTHER WEEK WITH DINNER, Disp: , Rfl:     gabapentin (NEURONTIN) 100 mg capsule, take 1 capsule by mouth every morning and 2 capsules at bedtime, Disp: , Rfl:     lisinopril (ZESTRIL) 20 mg tablet, Take 20 mg by mouth daily, Disp: , Rfl:     Current Allergies     Allergies as of 06/01/2022    (No Known Allergies)            The following portions of the patient's history were reviewed and updated as appropriate: allergies, current medications, past family history, past medical history, past social history, past surgical history and problem list      Past Medical History:   Diagnosis Date    Anemia     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[address] )     Bilateral carotid artery stenosis     Cardiomegaly     Chicken pox     Chronic kidney disease, unspecified     Diabetes mellitus ([facility] [address] )     Hypertension     Menopausal state     Umbilical hernia     Vitamin D deficiency        Past Surgical History:   Procedure Laterality Date    APPENDECTOMY      CHOLECYSTECTOMY      UMBILICAL HERNIA REPAIR         Family History   Problem Relation Age of Onset    Diabetes Mother     Colon cancer Family          Medications have been verified  Objective   /72   Pulse 70   Temp 97 6 °F (36 4 °C)   Resp 20   Ht 4' 11" (1 499 m)   Wt 55 2 kg (121 lb 9 6 oz)   SpO2 96%   BMI 24 56 kg/m²   No LMP recorded  Patient is postmenopausal        Physical Exam     Physical Exam  Vitals reviewed  Constitutional:       General: She is not in acute distress  Appearance: Normal appearance     HENT: Head: Normocephalic and atraumatic  Right Ear: Tympanic membrane and ear canal normal       Left Ear: Tympanic membrane and ear canal normal       Mouth/Throat:      Mouth: Mucous membranes are moist       Pharynx: No oropharyngeal exudate or posterior oropharyngeal erythema  Eyes:      Extraocular Movements: Extraocular movements intact  Conjunctiva/sclera: Conjunctivae normal       Pupils: Pupils are equal, round, and reactive to light  Cardiovascular:      Rate and Rhythm: Normal rate and regular rhythm  Pulses: Normal pulses  Heart sounds: Normal heart sounds  No murmur heard  Pulmonary:      Effort: Pulmonary effort is normal  No respiratory distress  Breath sounds: Normal breath sounds  Chest:   Breasts:      Right: Normal       Left: Swelling and tenderness present  No bleeding, inverted nipple, mass, nipple discharge or skin change  Abdominal:      General: Abdomen is flat  Bowel sounds are normal  There is no distension  Palpations: Abdomen is soft  Tenderness: There is no abdominal tenderness  There is no guarding or rebound  Musculoskeletal:         General: No swelling or tenderness  Normal range of motion  Cervical back: Normal range of motion and neck supple  No tenderness  Skin:     General: Skin is warm  Neurological:      General: No focal deficit present  Mental Status: She is alert     Psychiatric:         Mood and Affect: Mood normal          Behavior: Behavior normal          Judgment: Judgment normal

## 2022-06-01 NOTE — LETTER
June 1, 2022     Gertrudis Miller DO  [address] [facility] [place] [address]    Patient: Verona Rosario   YOB: 1935   Date of Visit: 6/1/2022        Dear Gertrudis Miller DO:    Your patient, Verona Rosario was seen in our urgent care department on 6/1/2022  Enclosed is a full report of that visit  If you have any questions or concerns, please don't hesitate to call             Sincerely,        SNEHAL Damon      CC: @WOO@    Enclosure

## 2022-10-26 LAB — HBA1C MFR BLD HPLC: 6.7 %

## 2023-03-16 ENCOUNTER — OFFICE VISIT (OUTPATIENT)
Dept: CARDIOLOGY CLINIC | Facility: CLINIC | Age: 88
End: 2023-03-16

## 2023-03-16 VITALS
DIASTOLIC BLOOD PRESSURE: 78 MMHG | HEART RATE: 70 BPM | BODY MASS INDEX: 24.8 KG/M2 | WEIGHT: 122.8 LBS | SYSTOLIC BLOOD PRESSURE: 122 MMHG

## 2023-03-16 DIAGNOSIS — I49.1 ISOLATED PREMATURE ATRIAL CONTRACTIONS: ICD-10-CM

## 2023-03-16 DIAGNOSIS — E78.2 MIXED HYPERLIPIDEMIA: ICD-10-CM

## 2023-03-16 DIAGNOSIS — I10 ESSENTIAL HYPERTENSION: Primary | ICD-10-CM

## 2023-03-16 RX ORDER — FINERENONE 10 MG/1
10 TABLET, FILM COATED ORAL
COMMUNITY
Start: 2023-03-01

## 2023-03-16 RX ORDER — PREDNISONE 20 MG/1
40 TABLET ORAL DAILY
COMMUNITY
Start: 2022-12-17

## 2023-03-16 NOTE — ASSESSMENT & PLAN NOTE
Ms Felicity Hines overall doing well with well-controlled blood pressures  She has some atypical symptoms of pain in the chest wall which appears to be musculoskeletal   On examination there is no focal tenderness  There is no evidence of significant valvular heart disease or signs of heart failure  Her ECG is normal and benign  Blood work indicates normal renal function, well-controlled lipids  She has borderline diabetes  She does follow with endocrinologist     -- Am advising her to continue current medications  -- Encouraging her to staying active  Advised her that if the chest wall pain becomes worse see family doctor to see if something can be prescribed  a topical analgesic  -- She is advised to follow-up with her family doctor regularly  We will plan for a cardiology office visit in 1 year time  -- Dietary and medical compliance are reinforced  --She is advised  to report any concerning symptoms such as chest pain, shortness of breath, decline in exercise tolerance or presyncope/syncope

## 2023-03-16 NOTE — PATIENT INSTRUCTIONS
CARDIOLOGY ASSESSMENT & PLAN     1  Essential hypertension  POCT ECG      2  Isolated premature atrial contractions        3  Mixed hyperlipidemia          Essential hypertension  Ms Norma Gannon overall doing well with well-controlled blood pressures  She has some atypical symptoms of pain in the chest wall which appears to be musculoskeletal   On examination there is no focal tenderness  There is no evidence of significant valvular heart disease or signs of heart failure  Her ECG is normal and benign  Blood work indicates normal renal function, well-controlled lipids  She has borderline diabetes  She does follow with endocrinologist     -- Am advising her to continue current medications  -- Encouraging her to staying active  Advised her that if the chest wall pain becomes worse see family doctor to see if something can be prescribed  a topical analgesic  -- She is advised to follow-up with her family doctor regularly  We will plan for a cardiology office visit in 1 year time  -- Dietary and medical compliance are reinforced  --She is advised  to report any concerning symptoms such as chest pain, shortness of breath, decline in exercise tolerance or presyncope/syncope

## 2023-03-16 NOTE — PROGRESS NOTES
[facility]  [address] [place] [address]  Phone#  [phone]  Fax#  [phone]  *-*-*-*-*-*-*-*-*-*-*-*-*-*-*-*-*-*-*-*-*-*-*-*-*-*-*-*-*-*-*-*-*-*-*-*-*-*-*-*-*-*-*-*-*-*-*-*-*-*-*-*-*-*  ENCOUNTER DATE: 03/16/23 2:00 PM  PATIENT NAME: Tony Loo   1935    [id number]  AGE:87 y o  SEX: female  ENCOUNTER PROVIDER:Ana Howard     PRIMARY CARE PHYSICIAN: Glenn Ragsdale DO    DIAGNOSES:  1  Benign essential hypertension  2  Mixed dyslipidemia  3  History of chest pain  4  History of diabetes mellitus  5  Hypo and hyperthyroidism  6  Anemia  7  Degenerative joint disease  8  History of diverticulosis  9  Cervical radiculopathy  10 History of carotid artery disease  11  History of cholecystectomy and appendectomy  12  Vitamin-D deficiency  13  History of umbilical hernia    Dobutamine stress echocardiogram at [facility] in March 2017 was negative for ischemia  EF was reported as 60%  No significant valvular abnormality  CURRENT ECG     Results for orders placed or performed in visit on 03/16/23   POCT ECG    Narrative    Sinus rhythm, HR 70 bpm, normal axis, possible prior septal infarction, RSR pattern in lead V1 V2, repolarization abnormalities  No significant chamber hypertrophy enlargement  No definite evidence of prior infarction  CARDIOLOGY ASSESSMENT & PLAN     1  Essential hypertension  POCT ECG      2  Isolated premature atrial contractions        3  Mixed hyperlipidemia          Essential hypertension  Ms Norma Gannon overall doing well with well-controlled blood pressures  She has some atypical symptoms of pain in the chest wall which appears to be musculoskeletal   On examination there is no focal tenderness  There is no evidence of significant valvular heart disease or signs of heart failure  Her ECG is normal and benign  Blood work indicates normal renal function, well-controlled lipids    She has borderline diabetes  She does follow with endocrinologist     -- Am advising her to continue current medications  -- Encouraging her to staying active  Advised her that if the chest wall pain becomes worse see family doctor to see if something can be prescribed  a topical analgesic  -- She is advised to follow-up with her family doctor regularly  We will plan for a cardiology office visit in 1 year time  -- Dietary and medical compliance are reinforced  --She is advised  to report any concerning symptoms such as chest pain, shortness of breath, decline in exercise tolerance or presyncope/syncope  INTERVAL HISTORY & HISTORY OF PRESENT ILLNESS     Marcus Baer is here for follow-up regarding her cardiac comorbidities which include: Hypertension, dyslipidemia and other comorbidities  He was last seen by me in September 2021  She mentions that since that time she has had no new diagnoses or hospitalizations or other significant illnesses  From a symptom perspective she denies any recent unusual chest pain or shortness of breath  She does occasionally get bitemporal and occipital headache which she relates with pain medication  She has significant stress relating to her daughter in nursing home  One of the daughters passed away July 2022 and the other 1 is very sick with congenital neurologic disorder  Patient has adequate family support to help her cope with stresst     Functional capacity status: Moderate to good   (Excellent- >10 METs; Good: (7-10 METs); Moderate (4-7 METs); Poor (<= 4 METs)    Any chronic stressors:  Stress related to having 1 remaining child in nursing home   (feeling of poor health, financial problems, and social isolation etc)  Tobacco or alcohol dependence: None    Current cardiac medications:  Lisinopril 20 mg daily metoprolol succinate 25 mg daily, simvastatin 40 mg daily, Magnesium 30 mg twice daily  Last routine chemistry was in February 2023    Sodium was 141 potassium 4 7 chloride 109 bicarb 26 BUN 19 creatinine 0 96 LFTs were normal   Hemoglobin A1c was 6 6 total cholesterol 161 triglyceride 83 HDL 84 calculated LDL 60 TSH 1 16    REVIEW OF SYSTEMS   Positive for:  As noted above in HPI  Negative for: All remaining as reviewed below and in HPI  SYSTEM SYMPTOMS REVIEWED:  General--weight change, fever, night sweats  Respiratory--cough, wheezing, shortness of breath, sputum production  Cardiovascular--chest pain, syncope, dyspnea on exertion, edema, decline in exercise tolerance, claudication   Gastrointestinal--persistent vomiting, diarrhea, abdominal distention, blood in stool   Muscular or skeletal--joint pain or swelling   Neurologic--headaches, syncope, abnormal movement  Hematologic--history of easy bruising and bleeding   Endocrine--thyroid enlargement, heat or cold intolerance, polyuria   Psychiatric--anxiety, depression     *-*-*-*-*-*-*-*-*-*-*-*-*-*-*-*-*-*-*-*-*-*-*-*-*-*-*-*-*-*-*-*-*-*-*-*-*-*-*-*-*-*-*-*-*-*-*-*-*-*-*-*-*-*-  VITAL SIGNS     CURRENT VITAL SIGNS:   Vitals:    03/16/23 1328   BP: 122/78   BP Location: Left arm   Patient Position: Sitting   Cuff Size: Adult   Pulse: 70   Weight: 55 7 kg (122 lb 12 8 oz)       BMI: Body mass index is 24 8 kg/m²      WEIGHTS:   Wt Readings from Last 25 Encounters:   03/16/23 55 7 kg (122 lb 12 8 oz)   06/01/22 55 2 kg (121 lb 9 6 oz)   09/24/21 55 9 kg (123 lb 3 2 oz)   03/18/21 57 2 kg (126 lb)   09/19/19 60 3 kg (133 lb)   06/27/19 61 2 kg (135 lb)   03/27/19 60 8 kg (134 lb)   02/27/19 61 1 kg (134 lb 9 6 oz)   01/17/19 61 8 kg (136 lb 3 2 oz)   01/02/19 60 5 kg (133 lb 6 4 oz)   11/28/18 61 6 kg (135 lb 12 8 oz)   10/31/18 61 5 kg (135 lb 9 6 oz)   10/28/18 62 1 kg (137 lb)   08/30/18 62 1 kg (136 lb 12 8 oz)        *-*-*-*-*-*-*-*-*-*-*-*-*-*-*-*-*-*-*-*-*-*-*-*-*-*-*-*-*-*-*-*-*-*-*-*-*-*-*-*-*-*-*-*-*-*-*-*-*-*-*-*-*-*-  PHYSICAL EXAM     General Appearance:    Alert, cooperative, no distress, appears stated age Head, Eyes, ENT:    No obvious abnormality, moist mucous mebranes  Neck:   Supple, no carotid bruit or JVD   Back:     Symmetric, no curvature  Lungs:     Respirations unlabored  Clear to auscultation bilaterally,    Chest wall:    No tenderness or deformity   Heart:    Regular rate and rhythm, S1 and S2 normal, no murmur, rub  or gallop  Abdomen:     Soft, non-tender, No obvious masses, or organomegaly   Extremities:   Extremities warm, no cyanosis or edema    Skin:   no venostatic changes in lower extremities  Normal skin color, texture, and turgor   No rashes or lesions     *-*-*-*-*-*-*-*-*-*-*-*-*-*-*-*-*-*-*-*-*-*-*-*-*-*-*-*-*-*-*-*-*-*-*-*-*-*-*-*-*-*-*-*-*-*-*-*-*-*-*-*-*-*-  CURRENT MEDICATIONS LIST     Current Outpatient Medications:   •  Accu-Chek Guide test strip, USE 1 TEST STRIP TO TEST BLOOD SUGAR TWICE DAILY, Disp: , Rfl:   •  Ascorbic Acid (VITAMIN C PO), Take 150 mg by mouth, Disp: , Rfl:   •  aspirin 81 MG tablet, Take 81 mg by mouth daily  , Disp: , Rfl:   •  ergocalciferol (VITAMIN D2) 50,000 units, TAKE 1 CAPSULE BY MOUTH EVERY OTHER WEEK WITH DINNER, Disp: , Rfl:   •  Finerenone (Kerendia) 10 MG TABS, Take 10 mg by mouth, Disp: , Rfl:   •  gabapentin (NEURONTIN) 100 mg capsule, take 1 capsule by mouth every morning and 2 capsules at bedtime, Disp: , Rfl:   •  ibuprofen (MOTRIN) 200 mg tablet, Take 400 mg by mouth every 8 (eight) hours as needed, Disp: , Rfl:   •  lisinopril (ZESTRIL) 10 mg tablet, take 1 tablet by mouth once daily at bedtime (Patient taking differently: 20 mg), Disp: 90 tablet, Rfl: 3  •  lisinopril (ZESTRIL) 20 mg tablet, Take 20 mg by mouth daily, Disp: , Rfl:   •  magnesium 30 MG tablet, Take 30 mg by mouth 2 (two) times a day, Disp: , Rfl:   •  methimazole (TAPAZOLE) 5 mg tablet, Take 1/2 tab BID by mouth daily (Patient taking differently: Daily), Disp: 30 tablet, Rfl: 0  •  metoprolol succinate (TOPROL-XL) 25 mg 24 hr tablet, Take 1 tablet (25 mg total) by mouth daily, Disp: 90 tablet, Rfl: 3  •  Multiple Vitamins-Minerals (MULTIVITAMIN ADULT PO), Take 1 tablet po once daily , Disp: , Rfl:   •  PHARMACIST CHOICE ALCOHOL 70 % PADS, USE 3 TIMES A DAY AFTER CHECKING BLOOD GLUCOSE, Disp: , Rfl: 3  •  predniSONE 20 mg tablet, Take 40 mg by mouth daily, Disp: , Rfl:   •  simvastatin (ZOCOR) 40 mg tablet, take 1 tablet (40MG)  by oral route  every day in the evening, Disp: , Rfl:   •  SITagliptin-metFORMIN HCl ER  MG TB24, Take 1 tablet by mouth daily 100-1000 one daily, Disp: , Rfl:   •  VITAMIN D PO, Take 50,000 mg by mouth, Disp: , Rfl:   •  alendronate (FOSAMAX) 35 mg tablet, TAKE 1 TABLET BY MOUTH IN THE MORNING EVERY WEEK WITH A FULL GLAS     (REFER TO PRESCRIPTION NOTES)   (Patient not taking: Reported on 3/16/2023), Disp: , Rfl:   •  ammonium lactate (LAC-HYDRIN) 12 % cream, Apply topically, Disp: , Rfl:   •  Docusate Sodium 100 MG capsule, Take 100 mg by mouth daily (Patient not taking: Reported on 3/16/2023), Disp: , Rfl:   •  furosemide (LASIX) 20 mg tablet, Take 20 mg by mouth daily  (Patient not taking: Reported on 3/16/2023), Disp: , Rfl:   •  linaCLOtide 72 MCG CAPS, Take by mouth (Patient not taking: Reported on 3/16/2023), Disp: , Rfl:   •  pregabalin (LYRICA) 75 mg capsule, take 1 capsule by mouth daily (Patient not taking: Reported on 3/16/2023), Disp: 90 capsule, Rfl: 0       ALLERGIES   No Known Allergies    *-*-*-*-*-*-*-*-*-*-*-*-*-*-*-*-*-*-*-*-*-*-*-*-*-*-*-*-*-*-*-*-*-*-*-*-*-*-*-*-*-*-*-*-*-*-*-*-*-*-*-*-*-*-  LABORATORY DATA     Sodium   Date Value Ref Range Status   06/14/2018 140 137 - 147 MEQ/L Final   05/07/2018 143 137 - 147 MEQ/L Final     Sodium   Date Value Ref Range Status   06/14/2018 140 137 - 147 MEQ/L Final   05/07/2018 143 137 - 147 MEQ/L Final     Potassium   Date Value Ref Range Status   10/10/2020 4 4 3 5 - 5 3 mmol/L Final   09/28/2019 4 5 3 6 - 5 0 mmol/L Final   03/11/2019 4 7 3 6 - 5 0 mmol/L Final   07/31/2018 4 3 3 5 - 5 3 mmol/L Final   06/14/2018 4 5 3 6 - 5 0 MEQ/L Final   05/07/2018 4 5 3 6 - 5 0 MEQ/L Final     Chloride   Date Value Ref Range Status   10/10/2020 104 100 - 108 mmol/L Final   09/28/2019 112 (H) 97 - 108 mmol/L Final   03/11/2019 104 97 - 108 mmol/L Final   07/31/2018 107 98 - 110 mmol/L Final   06/14/2018 104 97 - 108 MEQ/L Final   05/07/2018 110 (H) 97 - 108 MEQ/L Final     CO2   Date Value Ref Range Status   10/10/2020 26 21 - 32 mmol/L Final   09/28/2019 25 22 - 30 mmol/L Final   03/11/2019 23 22 - 30 mmol/L Final   07/31/2018 26 20 - 31 mmol/L Final   06/14/2018 27 22 - 30 MMOL/L Final   05/07/2018 23 22 - 30 MMOL/L Final     Anion Gap   Date Value Ref Range Status   06/14/2018 9 5 - 14 MMOL/L Final   05/07/2018 10 5 - 14 MMOL/L Final     BUN   Date Value Ref Range Status   10/10/2020 17 5 - 25 mg/dL Final   09/28/2019 21 5 - 25 mg/dL Final   03/11/2019 14 5 - 25 mg/dL Final   07/31/2018 15 7 - 25 mg/dL Final   06/14/2018 13 5 - 25 MG/DL Final   05/07/2018 19 5 - 25 MG/DL Final     Creatinine   Date Value Ref Range Status   10/10/2020 0 98 0 60 - 1 30 mg/dL Final     Comment:     Standardized to IDMS reference method   09/28/2019 0 99 0 60 - 1 20 mg/dL Final     Comment:     Standardized to IDMS reference method   03/11/2019 0 76 0 60 - 1 20 mg/dL Final     Comment:     Standardized to IDMS reference method     eGFR   Date Value Ref Range Status   10/10/2020 53 ml/min/1 73sq m Final   09/28/2019 53 (L) >60 ml/min/1 73sq m Final   03/11/2019 73 >60 ml/min/1 73sq m Final     Glucose   Date Value Ref Range Status   06/14/2018 103 (H) 70 - 99 MG/DL Final   05/07/2018 155 (H) 70 - 99 MG/DL Final     Calcium   Date Value Ref Range Status   10/10/2020 9 1 8 3 - 10 1 mg/dL Final   09/28/2019 9 5 8 4 - 10 2 mg/dL Final   03/11/2019 9 3 8 4 - 10 2 mg/dL Final   07/31/2018 9 3 8 6 - 10 4 mg/dL Final   06/14/2018 9 4 8 4 - 10 2 MG/DL Final   05/07/2018 9 3 8 4 - 10 2 MG/DL Final     AST   Date Value Ref Range Status 10/10/2020 16 5 - 45 U/L Final     Comment:     Specimen collection should occur prior to Sulfasalazine administration due to the potential for falsely depressed results  09/28/2019 19 14 - 36 U/L Final     Comment:       Specimen collection should occur prior to Sulfasalazine administration due to the potential for falsely depressed results  07/31/2018 17 10 - 35 U/L Final   05/07/2018 20 14 - 36 U/L Final     ALT   Date Value Ref Range Status   10/10/2020 17 12 - 78 U/L Final     Comment:     Specimen collection should occur prior to Sulfasalazine administration due to the potential for falsely depressed results  09/28/2019 19 9 - 52 U/L Final     Comment:       Specimen collection should occur prior to Sulfasalazine administration due to the potential for falsely depressed results      07/31/2018 9 6 - 29 U/L Final   05/07/2018 23 9 - 52 U/L Final     Alkaline Phosphatase   Date Value Ref Range Status   10/10/2020 75 46 - 116 U/L Final   09/28/2019 61 43 - 122 U/L Final   07/31/2018 60 33 - 130 U/L Final   05/07/2018 65 43 - 122 U/L Final     Total Protein   Date Value Ref Range Status   05/07/2018 6 6 5 9 - 8 4 G/DL Final     Total Bilirubin   Date Value Ref Range Status   05/07/2018 0 2 <1 3 mg/dL Final     WBC   Date Value Ref Range Status   10/10/2020 6 93 4 31 - 10 16 Thousand/uL Final   09/28/2019 6 60 4 31 - 10 16 Thousand/uL Final   07/22/2019 6 50 4 31 - 10 16 Thousand/uL Final   06/14/2018 5 3 4 5 - 11 0 K/MCL Final     Hemoglobin   Date Value Ref Range Status   10/10/2020 10 9 (L) 11 5 - 15 4 g/dL Final   09/28/2019 9 7 (L) 12 0 - 16 0 g/dL Final   07/22/2019 10 2 (L) 12 0 - 16 0 g/dL Final   06/14/2018 10 2 (L) 12 0 - 16 0 G/DL Final     Platelets   Date Value Ref Range Status   10/10/2020 237 149 - 390 Thousands/uL Final   09/28/2019 158 150 - 450 Thousands/uL Final   07/22/2019 162 150 - 450 Thousands/uL Final   06/14/2018 149 (L) 150 - 450 K/MCL Final     No results found for: PT, PTT, INR  No results found for: CKMB, DIGOXIN  TSH   Date Value Ref Range Status   07/31/2018 1 43 0 40 - 4 50 mIU/L Final     No results found for: CHOL   Hemoglobin A1C   Date Value Ref Range Status   02/20/2023 6 6 (H) <5 7 % Final     Comment:     Reference Range  Non-diabetic                     <5 7  Pre-diabetic                     5 7-6 4  Diabetic                         >=6 5  ADA target for diabetic control  <=7     Urine Culture   Date Value Ref Range Status   03/27/2019 >100,000 cfu/ml Escherichia coli (A)  Final   01/25/2019 10,000-19,000 cfu/ml  Final     Comment:     Mixed Contaminants X3       *-*-*-*-*-*-*-*-*-*-*-*-*-*-*-*-*-*-*-*-*-*-*-*-*-*-*-*-*-*-*-*-*-*-*-*-*-*-*-*-*-*-*-*-*-*-*-*-*-*-*-*-*-*-  PREVIOUS CARDIOLOGY & RADIOLOGY TEST RESULTS   I have personally reviewed pertinent results of cardiovascular tests noted below  No results found for this or any previous visit  No results found for this or any previous visit  No results found for this or any previous visit  No results found for this or any previous visit  DXA bone density spine hip and pelvis  Narrative: DXA SCAN    CLINICAL HISTORY:  51-year-old female  Menopause at age 55  OTHER RISK FACTORS:  Hyperparathyroidism  Furosemide therapy  PHARMACOLOGIC THERAPY FOR OSTEOPOROSIS:  None  TECHNIQUE: Bone densitometry was performed using a [employer] C   bone densitometer  Regions of interest appear properly placed  COMPARISON: 7/22/2019  RESULTS:     LUMBAR SPINE: Not assessed because  generalized spondylosis results in fewer than two evaluable vertebrae  [place] LEFT  TOTAL HIP:   BMD:  0 824  gm/cm2   T-score:  -1 0    LEFT  FEMORAL NECK:   BMD:  0 720  gm/cm2   T score: -1 2     LEFT  FOREARM:    33% RADIUS BMD:  0 607  gm/cm2  T-score:  -1 3   Impression: 1  Low bone mass (osteopenia)      2   Since a DXA study from 7/22/2019, there has been:  A  STATISTICALLY SIGNIFICANT INCREASE in bone mineral density of  0 041 g/cm2 (5 2)% in the left total hip  A  STATISTICALLY SIGNIFICANT DECREASE in bone mineral density of  0 042 g/cm2 (6 5)% in the left radius  3   The 10 year risk of hip fracture is 3 1% with the 10 year risk of major osteoporotic fracture being 12% as calculated by the [facility]/WHO fracture risk assessment tool (FRAX)  4   The current NOF guidelines recommend treating patients with a T-score of -2 5 or less in the lumbar spine or hips, or in post-menopausal women and men over the age of 48 with low bone mass (osteopenia) and a FRAX 10 year risk score of >3% for hip   fracture and/or >20% for major osteoporotic fracture  5   The NOF recommends follow-up DXA in 1-2 years after initiating therapy for osteoporosis and every 2 years thereafter  More frequent evaluation is appropriate for patients with conditions associated with rapid bone loss, such as glucocorticoid   therapy  The interval between DXA screenings may be longer for individuals without major risk factors and initial T-score in the normal or upper low bone mass range  The FRAX algorithm has certain limitations:  -FRAX has not been validated in patients currently or previously treated with pharmacotherapy for osteoporosis  In such patients, clinical judgment must be exercised in interpreting FRAX scores  -Prior hip, vertebral and humeral fragility fractures appear to confer greater risk of subsequent fracture than fractures at other sites (this is especially true for individuals with severe vertebral fractures), but quantification of this incremental   risk is not possible with FRAX  -FRAX underestimates fracture risk in patients with history of multiple fragility fractures  -FRAX may underestimate fracture risk in patients with history of frequent falls   -It is not appropriate to use FRAX to monitor treatment response      WHO CLASSIFICATION:  Normal (a T-score of -1 0 or higher)  Low bone mineral density (a T-score of less than -1 0 but higher than -2 5)  Osteoporosis (a T-score of -2 5 or less)  Severe osteoporosis (a T-score of -2 5 or less with a fragility fracture)    LEAST SIGNIFICANT CHANGE (AT 95% C  I):  Lumbar spine: 0 020 g/cm2 (2 2%)  Total hip: 0 033 g/cm2 (3 8%)  Forearm: 0 021 g/cm2 (3 3%)    Workstation performed: [id number]        *-*-*-*-*-*-*-*-*-*-*-*-*-*-*-*-*-*-*-*-*-*-*-*-*-*-*-*-*-*-*-*-*-*-*-*-*-*-*-*-*-*-*-*-*-*-*-*-*-*-*-*-*-*-  SIGNATURES:   [unfilled]   Ana Howard MD; MHA    *-*-*-*-*-*-*-*-*-*-*-*-*-*-*-*-*-*-*-*-*-*-*-*-*-*-*-*-*-*-*-*-*-*-*-*-*-*-*-*-*-*-*-*-*-*-*-*-*-*-*-*-*-*-  PAST MEDICAL HISTORY:  Past Medical History:   Diagnosis Date   • Anemia    • 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[address] )    • Bilateral carotid artery stenosis    • Cardiomegaly    • Chicken pox    • Chronic kidney disease, unspecified    • Diabetes mellitus (HCC)    • Hypertension    • Menopausal state    • Umbilical hernia    • Vitamin D deficiency     PAST SURGICAL HISTORY:  Past Surgical History:   Procedure Laterality Date   • APPENDECTOMY     • CHOLECYSTECTOMY     • UMBILICAL HERNIA REPAIR           FAMILY HISTORY:  Family History   Problem Relation Age of Onset   • Diabetes Mother    • Colon cancer Family     SOCIAL HISTORY:  Social History     Tobacco Use   Smoking Status Never   Smokeless Tobacco Never   Tobacco Comments    no passive smoke exposure      Social History     Substance and Sexual Activity   Alcohol Use No     Social History     Substance and Sexual Activity   Drug Use No    [unfilled]     *-*-*-*-*-*-*-*-*-*-*-*-*-*-*-*-*-*-*-*-*-*-*-*-*-*-*-*-*-*-*-*-*-*-*-*-*-*-*-*-*-*-*-*-*-*-*-*-*-*-*-*-*-*  ALLERGIES:  No Known Allergies CURRENT SCHEDULED MEDICATIONS:    Current Outpatient Medications:   •  Accu-Chek Guide test strip, USE 1 TEST STRIP TO TEST BLOOD SUGAR TWICE DAILY, Disp: , Rfl:   •  Ascorbic Acid (VITAMIN C PO), Take 150 mg by mouth, Disp: , Rfl:   •  aspirin 81 MG tablet, Take 81 mg by mouth daily  , Disp: , Rfl:   •  ergocalciferol (VITAMIN D2) 50,000 units, TAKE 1 CAPSULE BY MOUTH EVERY OTHER WEEK WITH DINNER, Disp: , Rfl:   •  Finerenone (Kerendia) 10 MG TABS, Take 10 mg by mouth, Disp: , Rfl:   •  gabapentin (NEURONTIN) 100 mg capsule, take 1 capsule by mouth every morning and 2 capsules at bedtime, Disp: , Rfl:   •  ibuprofen (MOTRIN) 200 mg tablet, Take 400 mg by mouth every 8 (eight) hours as needed, Disp: , Rfl:   •  lisinopril (ZESTRIL) 10 mg tablet, take 1 tablet by mouth once daily at bedtime (Patient taking differently: 20 mg), Disp: 90 tablet, Rfl: 3  •  lisinopril (ZESTRIL) 20 mg tablet, Take 20 mg by mouth daily, Disp: , Rfl:   •  magnesium 30 MG tablet, Take 30 mg by mouth 2 (two) times a day, Disp: , Rfl:   •  methimazole (TAPAZOLE) 5 mg tablet, Take 1/2 tab BID by mouth daily (Patient taking differently: Daily), Disp: 30 tablet, Rfl: 0  •  metoprolol succinate (TOPROL-XL) 25 mg 24 hr tablet, Take 1 tablet (25 mg total) by mouth daily, Disp: 90 tablet, Rfl: 3  •  Multiple Vitamins-Minerals (MULTIVITAMIN ADULT PO), Take 1 tablet po once daily , Disp: , Rfl:   •  PHARMACIST CHOICE ALCOHOL 70 % PADS, USE 3 TIMES A DAY AFTER CHECKING BLOOD GLUCOSE, Disp: , Rfl: 3  •  predniSONE 20 mg tablet, Take 40 mg by mouth daily, Disp: , Rfl:   •  simvastatin (ZOCOR) 40 mg tablet, take 1 tablet (40MG)  by oral route  every day in the evening, Disp: , Rfl:   •  SITagliptin-metFORMIN HCl ER  MG TB24, Take 1 tablet by mouth daily 100-1000 one daily, Disp: , Rfl:   •  VITAMIN D PO, Take 50,000 mg by mouth, Disp: , Rfl:   •  alendronate (FOSAMAX) 35 mg tablet, TAKE 1 TABLET BY MOUTH IN THE MORNING EVERY WEEK WITH A FULL GLAS     (REFER TO PRESCRIPTION NOTES)   (Patient not taking: Reported on 3/16/2023), Disp: , Rfl:   •  ammonium lactate (LAC-HYDRIN) 12 % cream, Apply topically, Disp: , Rfl:   •  Docusate Sodium 100 MG capsule, Take 100 mg by mouth daily (Patient not taking: Reported on 3/16/2023), Disp: , Rfl:   • furosemide (LASIX) 20 mg tablet, Take 20 mg by mouth daily  (Patient not taking: Reported on 3/16/2023), Disp: , Rfl:   •  linaCLOtide 72 MCG CAPS, Take by mouth (Patient not taking: Reported on 3/16/2023), Disp: , Rfl:   •  pregabalin (LYRICA) 75 mg capsule, take 1 capsule by mouth daily (Patient not taking: Reported on 3/16/2023), Disp: 90 capsule, Rfl: 0     *-*-*-*-*-*-*-*-*-*-*-*-*-*-*-*-*-*-*-*-*-*-*-*-*-*-*-*-*-*-*-*-*-*-*-*-*-*-*-*-*-*-*-*-*-*-*-*-*-*-*-*-*-*

## 2024-01-12 ENCOUNTER — HOSPITAL ENCOUNTER (OUTPATIENT)
Dept: BONE DENSITY | Facility: CLINIC | Age: 89
End: 2024-01-12
Payer: COMMERCIAL

## 2024-01-12 VITALS — WEIGHT: 120 LBS | HEIGHT: 58 IN | BODY MASS INDEX: 25.19 KG/M2

## 2024-01-12 DIAGNOSIS — Z13.820 OSTEOPOROSIS SCREENING: ICD-10-CM

## 2024-01-12 DIAGNOSIS — M85.89 OTHER SPECIFIED DISORDERS OF BONE DENSITY AND STRUCTURE, MULTIPLE SITES: ICD-10-CM

## 2024-01-12 PROCEDURE — 77080 DXA BONE DENSITY AXIAL: CPT
